# Patient Record
Sex: MALE | Race: WHITE | NOT HISPANIC OR LATINO | Employment: UNEMPLOYED | URBAN - METROPOLITAN AREA
[De-identification: names, ages, dates, MRNs, and addresses within clinical notes are randomized per-mention and may not be internally consistent; named-entity substitution may affect disease eponyms.]

---

## 2020-05-06 ENCOUNTER — HOSPITAL ENCOUNTER (OUTPATIENT)
Facility: HOSPITAL | Age: 70
Setting detail: SURGERY ADMIT
End: 2020-05-06
Attending: THORACIC SURGERY (CARDIOTHORACIC VASCULAR SURGERY) | Admitting: THORACIC SURGERY (CARDIOTHORACIC VASCULAR SURGERY)

## 2020-05-06 DIAGNOSIS — I25.118 CORONARY ARTERY DISEASE INVOLVING NATIVE CORONARY ARTERY OF NATIVE HEART WITH OTHER FORM OF ANGINA PECTORIS (HCC): Primary | ICD-10-CM

## 2020-05-06 PROBLEM — I25.10 CORONARY ARTERY DISEASE: Status: ACTIVE | Noted: 2020-05-06

## 2020-05-07 ENCOUNTER — HOSPITAL ENCOUNTER (INPATIENT)
Facility: HOSPITAL | Age: 70
LOS: 6 days | Discharge: HOME HEALTH | End: 2020-05-13
Attending: INTERNAL MEDICINE | Admitting: THORACIC SURGERY (CARDIOTHORACIC VASCULAR SURGERY)

## 2020-05-07 ENCOUNTER — APPOINTMENT (OUTPATIENT)
Dept: GENERAL RADIOLOGY | Facility: HOSPITAL | Age: 70
End: 2020-05-07

## 2020-05-07 DIAGNOSIS — I25.119 CORONARY ARTERY DISEASE INVOLVING NATIVE CORONARY ARTERY OF NATIVE HEART WITH ANGINA PECTORIS (HCC): Primary | ICD-10-CM

## 2020-05-07 PROBLEM — I25.10 CAD (CORONARY ARTERY DISEASE): Status: ACTIVE | Noted: 2020-05-07

## 2020-05-07 PROBLEM — I21.4 NSTEMI (NON-ST ELEVATED MYOCARDIAL INFARCTION) (HCC): Status: ACTIVE | Noted: 2020-05-07

## 2020-05-07 PROBLEM — C34.92 SQUAMOUS CELL CARCINOMA LUNG, LEFT (HCC): Status: ACTIVE | Noted: 2020-05-07

## 2020-05-07 PROBLEM — J44.9 COPD (CHRONIC OBSTRUCTIVE PULMONARY DISEASE) (HCC): Status: ACTIVE | Noted: 2020-05-07

## 2020-05-07 PROBLEM — C44.329 SQUAMOUS CELL CARCINOMA OF SKIN OF CHEEK: Status: ACTIVE | Noted: 2020-05-07

## 2020-05-07 PROBLEM — M19.90 OSTEOARTHRITIS: Status: ACTIVE | Noted: 2020-05-07

## 2020-05-07 LAB
ABO GROUP BLD: NORMAL
ALBUMIN SERPL-MCNC: 3.9 G/DL (ref 3.5–5.2)
ALBUMIN/GLOB SERPL: 1.2 G/DL
ALP SERPL-CCNC: 50 U/L (ref 39–117)
ALT SERPL W P-5'-P-CCNC: 20 U/L (ref 1–41)
AMPHET+METHAMPHET UR QL: NEGATIVE
AMPHETAMINES UR QL: NEGATIVE
ANION GAP SERPL CALCULATED.3IONS-SCNC: 12 MMOL/L (ref 5–15)
APTT PPP: 49.1 SECONDS (ref 50–95)
AST SERPL-CCNC: 30 U/L (ref 1–40)
BARBITURATES UR QL SCN: NEGATIVE
BASOPHILS # BLD AUTO: 0.05 10*3/MM3 (ref 0–0.2)
BASOPHILS NFR BLD AUTO: 0.6 % (ref 0–1.5)
BENZODIAZ UR QL SCN: NEGATIVE
BILIRUB SERPL-MCNC: 0.5 MG/DL (ref 0.2–1.2)
BLD GP AB SCN SERPL QL: NEGATIVE
BUN BLD-MCNC: 12 MG/DL (ref 8–23)
BUN/CREAT SERPL: 13 (ref 7–25)
BUPRENORPHINE SERPL-MCNC: NEGATIVE NG/ML
CALCIUM SPEC-SCNC: 9.4 MG/DL (ref 8.6–10.5)
CANNABINOIDS SERPL QL: NEGATIVE
CHLORIDE SERPL-SCNC: 98 MMOL/L (ref 98–107)
CO2 SERPL-SCNC: 25 MMOL/L (ref 22–29)
COCAINE UR QL: NEGATIVE
CREAT BLD-MCNC: 0.92 MG/DL (ref 0.76–1.27)
DEPRECATED RDW RBC AUTO: 47.6 FL (ref 37–54)
EOSINOPHIL # BLD AUTO: 0.14 10*3/MM3 (ref 0–0.4)
EOSINOPHIL NFR BLD AUTO: 1.7 % (ref 0.3–6.2)
ERYTHROCYTE [DISTWIDTH] IN BLOOD BY AUTOMATED COUNT: 14.3 % (ref 12.3–15.4)
GFR SERPL CREATININE-BSD FRML MDRD: 81 ML/MIN/1.73
GLOBULIN UR ELPH-MCNC: 3.3 GM/DL
GLUCOSE BLD-MCNC: 121 MG/DL (ref 65–99)
HCT VFR BLD AUTO: 45.5 % (ref 37.5–51)
HGB BLD-MCNC: 14.7 G/DL (ref 13–17.7)
IMM GRANULOCYTES # BLD AUTO: 0.03 10*3/MM3 (ref 0–0.05)
IMM GRANULOCYTES NFR BLD AUTO: 0.4 % (ref 0–0.5)
INR PPP: 1.07 (ref 0.85–1.16)
LYMPHOCYTES # BLD AUTO: 1.17 10*3/MM3 (ref 0.7–3.1)
LYMPHOCYTES NFR BLD AUTO: 13.8 % (ref 19.6–45.3)
MAGNESIUM SERPL-MCNC: 3.4 MG/DL (ref 1.6–2.4)
MCH RBC QN AUTO: 29.5 PG (ref 26.6–33)
MCHC RBC AUTO-ENTMCNC: 32.3 G/DL (ref 31.5–35.7)
MCV RBC AUTO: 91.2 FL (ref 79–97)
METHADONE UR QL SCN: NEGATIVE
MONOCYTES # BLD AUTO: 1.08 10*3/MM3 (ref 0.1–0.9)
MONOCYTES NFR BLD AUTO: 12.8 % (ref 5–12)
NEUTROPHILS # BLD AUTO: 6 10*3/MM3 (ref 1.7–7)
NEUTROPHILS NFR BLD AUTO: 70.7 % (ref 42.7–76)
NRBC BLD AUTO-RTO: 0 /100 WBC (ref 0–0.2)
OPIATES UR QL: NEGATIVE
OXYCODONE UR QL SCN: NEGATIVE
PA ADP PRP-ACNC: 192 PRU
PCP UR QL SCN: NEGATIVE
PLATELET # BLD AUTO: 253 10*3/MM3 (ref 140–450)
PMV BLD AUTO: 8.8 FL (ref 6–12)
POTASSIUM BLD-SCNC: 4.5 MMOL/L (ref 3.5–5.2)
PROPOXYPH UR QL: NEGATIVE
PROT SERPL-MCNC: 7.2 G/DL (ref 6–8.5)
PROTHROMBIN TIME: 13.6 SECONDS (ref 11.5–14)
RBC # BLD AUTO: 4.99 10*6/MM3 (ref 4.14–5.8)
RH BLD: NEGATIVE
SARS-COV-2 RNA RESP QL NAA+PROBE: NOT DETECTED
SODIUM BLD-SCNC: 135 MMOL/L (ref 136–145)
T&S EXPIRATION DATE: NORMAL
TRICYCLICS UR QL SCN: NEGATIVE
TROPONIN T SERPL-MCNC: 1.13 NG/ML (ref 0–0.03)
TROPONIN T SERPL-MCNC: 1.27 NG/ML (ref 0–0.03)
UFH PPP CHRO-ACNC: 0.1 IU/ML (ref 0.3–0.7)
UFH PPP CHRO-ACNC: 0.35 IU/ML (ref 0.3–0.7)
WBC NRBC COR # BLD: 8.47 10*3/MM3 (ref 3.4–10.8)

## 2020-05-07 PROCEDURE — 02HV33Z INSERTION OF INFUSION DEVICE INTO SUPERIOR VENA CAVA, PERCUTANEOUS APPROACH: ICD-10-PCS | Performed by: THORACIC SURGERY (CARDIOTHORACIC VASCULAR SURGERY)

## 2020-05-07 PROCEDURE — 80306 DRUG TEST PRSMV INSTRMNT: CPT | Performed by: PHYSICIAN ASSISTANT

## 2020-05-07 PROCEDURE — 85025 COMPLETE CBC W/AUTO DIFF WBC: CPT | Performed by: FAMILY MEDICINE

## 2020-05-07 PROCEDURE — 86901 BLOOD TYPING SEROLOGIC RH(D): CPT

## 2020-05-07 PROCEDURE — 84484 ASSAY OF TROPONIN QUANT: CPT | Performed by: FAMILY MEDICINE

## 2020-05-07 PROCEDURE — 25010000002 HEPARIN (PORCINE) PER 1000 UNITS: Performed by: FAMILY MEDICINE

## 2020-05-07 PROCEDURE — 71045 X-RAY EXAM CHEST 1 VIEW: CPT

## 2020-05-07 PROCEDURE — 86900 BLOOD TYPING SEROLOGIC ABO: CPT

## 2020-05-07 PROCEDURE — 87635 SARS-COV-2 COVID-19 AMP PRB: CPT | Performed by: FAMILY MEDICINE

## 2020-05-07 PROCEDURE — 86900 BLOOD TYPING SEROLOGIC ABO: CPT | Performed by: FAMILY MEDICINE

## 2020-05-07 PROCEDURE — 82962 GLUCOSE BLOOD TEST: CPT

## 2020-05-07 PROCEDURE — 86901 BLOOD TYPING SEROLOGIC RH(D): CPT | Performed by: FAMILY MEDICINE

## 2020-05-07 PROCEDURE — 93005 ELECTROCARDIOGRAM TRACING: CPT | Performed by: PHYSICIAN ASSISTANT

## 2020-05-07 PROCEDURE — 86850 RBC ANTIBODY SCREEN: CPT | Performed by: FAMILY MEDICINE

## 2020-05-07 PROCEDURE — 83735 ASSAY OF MAGNESIUM: CPT | Performed by: FAMILY MEDICINE

## 2020-05-07 PROCEDURE — 85576 BLOOD PLATELET AGGREGATION: CPT | Performed by: PHYSICIAN ASSISTANT

## 2020-05-07 PROCEDURE — 25010000002 HEPARIN (PORCINE) 25000-0.45 UT/250ML-% SOLUTION: Performed by: FAMILY MEDICINE

## 2020-05-07 PROCEDURE — 99223 1ST HOSP IP/OBS HIGH 75: CPT | Performed by: THORACIC SURGERY (CARDIOTHORACIC VASCULAR SURGERY)

## 2020-05-07 PROCEDURE — 85520 HEPARIN ASSAY: CPT | Performed by: FAMILY MEDICINE

## 2020-05-07 PROCEDURE — 85730 THROMBOPLASTIN TIME PARTIAL: CPT | Performed by: FAMILY MEDICINE

## 2020-05-07 PROCEDURE — 85610 PROTHROMBIN TIME: CPT | Performed by: FAMILY MEDICINE

## 2020-05-07 PROCEDURE — 86923 COMPATIBILITY TEST ELECTRIC: CPT

## 2020-05-07 PROCEDURE — 99223 1ST HOSP IP/OBS HIGH 75: CPT | Performed by: FAMILY MEDICINE

## 2020-05-07 PROCEDURE — 80053 COMPREHEN METABOLIC PANEL: CPT | Performed by: FAMILY MEDICINE

## 2020-05-07 PROCEDURE — 93010 ELECTROCARDIOGRAM REPORT: CPT | Performed by: INTERNAL MEDICINE

## 2020-05-07 RX ORDER — HEPARIN SODIUM 1000 [USP'U]/ML
30 INJECTION, SOLUTION INTRAVENOUS; SUBCUTANEOUS AS NEEDED
Status: DISCONTINUED | OUTPATIENT
Start: 2020-05-07 | End: 2020-05-07

## 2020-05-07 RX ORDER — IPRATROPIUM BROMIDE AND ALBUTEROL SULFATE 2.5; .5 MG/3ML; MG/3ML
3 SOLUTION RESPIRATORY (INHALATION) EVERY 4 HOURS PRN
Status: DISCONTINUED | OUTPATIENT
Start: 2020-05-07 | End: 2020-05-13 | Stop reason: HOSPADM

## 2020-05-07 RX ORDER — CHLORHEXIDINE GLUCONATE 0.12 MG/ML
15 RINSE ORAL EVERY 12 HOURS
Status: COMPLETED | OUTPATIENT
Start: 2020-05-07 | End: 2020-05-08

## 2020-05-07 RX ORDER — ACETAMINOPHEN 325 MG/1
650 TABLET ORAL EVERY 4 HOURS PRN
Status: DISCONTINUED | OUTPATIENT
Start: 2020-05-07 | End: 2020-05-13 | Stop reason: HOSPADM

## 2020-05-07 RX ORDER — ONDANSETRON 4 MG/1
4 TABLET, FILM COATED ORAL EVERY 6 HOURS PRN
Status: DISCONTINUED | OUTPATIENT
Start: 2020-05-07 | End: 2020-05-08

## 2020-05-07 RX ORDER — SODIUM CHLORIDE 0.9 % (FLUSH) 0.9 %
10 SYRINGE (ML) INJECTION EVERY 12 HOURS SCHEDULED
Status: DISCONTINUED | OUTPATIENT
Start: 2020-05-07 | End: 2020-05-11

## 2020-05-07 RX ORDER — ASPIRIN 81 MG/1
81 TABLET ORAL DAILY
Status: DISCONTINUED | OUTPATIENT
Start: 2020-05-07 | End: 2020-05-13 | Stop reason: HOSPADM

## 2020-05-07 RX ORDER — LISINOPRIL 5 MG/1
5 TABLET ORAL
Status: DISCONTINUED | OUTPATIENT
Start: 2020-05-08 | End: 2020-05-08

## 2020-05-07 RX ORDER — CARVEDILOL 3.12 MG/1
3.12 TABLET ORAL 2 TIMES DAILY WITH MEALS
Status: DISCONTINUED | OUTPATIENT
Start: 2020-05-07 | End: 2020-05-08

## 2020-05-07 RX ORDER — SODIUM CHLORIDE 0.9 % (FLUSH) 0.9 %
10 SYRINGE (ML) INJECTION EVERY 12 HOURS SCHEDULED
Status: DISCONTINUED | OUTPATIENT
Start: 2020-05-07 | End: 2020-05-08

## 2020-05-07 RX ORDER — HEPARIN SODIUM 1000 [USP'U]/ML
60 INJECTION, SOLUTION INTRAVENOUS; SUBCUTANEOUS AS NEEDED
Status: DISCONTINUED | OUTPATIENT
Start: 2020-05-07 | End: 2020-05-07

## 2020-05-07 RX ORDER — ONDANSETRON 2 MG/ML
4 INJECTION INTRAMUSCULAR; INTRAVENOUS EVERY 6 HOURS PRN
Status: DISCONTINUED | OUTPATIENT
Start: 2020-05-07 | End: 2020-05-08

## 2020-05-07 RX ORDER — HEPARIN SODIUM 1000 [USP'U]/ML
60 INJECTION, SOLUTION INTRAVENOUS; SUBCUTANEOUS ONCE
Status: COMPLETED | OUTPATIENT
Start: 2020-05-07 | End: 2020-05-07

## 2020-05-07 RX ORDER — SODIUM CHLORIDE 0.9 % (FLUSH) 0.9 %
10 SYRINGE (ML) INJECTION AS NEEDED
Status: DISCONTINUED | OUTPATIENT
Start: 2020-05-07 | End: 2020-05-08

## 2020-05-07 RX ORDER — ACETAMINOPHEN 650 MG/1
650 SUPPOSITORY RECTAL EVERY 4 HOURS PRN
Status: DISCONTINUED | OUTPATIENT
Start: 2020-05-07 | End: 2020-05-13 | Stop reason: HOSPADM

## 2020-05-07 RX ORDER — CHLORHEXIDINE GLUCONATE 500 MG/1
1 CLOTH TOPICAL EVERY 12 HOURS PRN
Status: DISCONTINUED | OUTPATIENT
Start: 2020-05-07 | End: 2020-05-10

## 2020-05-07 RX ORDER — ACETAMINOPHEN 160 MG/5ML
650 SOLUTION ORAL EVERY 4 HOURS PRN
Status: DISCONTINUED | OUTPATIENT
Start: 2020-05-07 | End: 2020-05-13 | Stop reason: HOSPADM

## 2020-05-07 RX ORDER — ATORVASTATIN CALCIUM 40 MG/1
80 TABLET, FILM COATED ORAL NIGHTLY
Status: DISCONTINUED | OUTPATIENT
Start: 2020-05-07 | End: 2020-05-13 | Stop reason: HOSPADM

## 2020-05-07 RX ORDER — HEPARIN SODIUM 10000 [USP'U]/100ML
19 INJECTION, SOLUTION INTRAVENOUS
Status: DISCONTINUED | OUTPATIENT
Start: 2020-05-07 | End: 2020-05-08

## 2020-05-07 RX ADMIN — HEPARIN SODIUM 4270 UNITS: 1000 INJECTION INTRAVENOUS; SUBCUTANEOUS at 14:46

## 2020-05-07 RX ADMIN — MUPIROCIN 1 APPLICATION: 20 OINTMENT TOPICAL at 22:04

## 2020-05-07 RX ADMIN — HEPARIN SODIUM 19 UNITS/KG/HR: 10000 INJECTION, SOLUTION INTRAVENOUS at 14:45

## 2020-05-07 RX ADMIN — ASPIRIN 81 MG: 81 TABLET, COATED ORAL at 17:17

## 2020-05-07 RX ADMIN — SODIUM CHLORIDE, PRESERVATIVE FREE 10 ML: 5 INJECTION INTRAVENOUS at 22:05

## 2020-05-07 RX ADMIN — CHLORHEXIDINE GLUCONATE 0.12% ORAL RINSE 15 ML: 1.2 LIQUID ORAL at 18:30

## 2020-05-07 RX ADMIN — ATORVASTATIN CALCIUM 80 MG: 40 TABLET, FILM COATED ORAL at 22:04

## 2020-05-07 RX ADMIN — CARVEDILOL 3.12 MG: 3.12 TABLET, FILM COATED ORAL at 17:17

## 2020-05-08 ENCOUNTER — APPOINTMENT (OUTPATIENT)
Dept: GENERAL RADIOLOGY | Facility: HOSPITAL | Age: 70
End: 2020-05-08

## 2020-05-08 ENCOUNTER — APPOINTMENT (OUTPATIENT)
Dept: CARDIOLOGY | Facility: HOSPITAL | Age: 70
End: 2020-05-08

## 2020-05-08 ENCOUNTER — APPOINTMENT (OUTPATIENT)
Dept: PULMONOLOGY | Facility: HOSPITAL | Age: 70
End: 2020-05-08

## 2020-05-08 ENCOUNTER — ANESTHESIA EVENT (OUTPATIENT)
Dept: PERIOP | Facility: HOSPITAL | Age: 70
End: 2020-05-08

## 2020-05-08 ENCOUNTER — ANESTHESIA (OUTPATIENT)
Dept: PERIOP | Facility: HOSPITAL | Age: 70
End: 2020-05-08

## 2020-05-08 PROBLEM — Z72.0 TOBACCO USE: Status: ACTIVE | Noted: 2020-05-08

## 2020-05-08 PROBLEM — J43.9 PULMONARY EMPHYSEMA (HCC): Status: ACTIVE | Noted: 2020-05-07

## 2020-05-08 LAB
ABO GROUP BLD: NORMAL
ACT BLD: 125 SECONDS (ref 82–152)
ACT BLD: 147 SECONDS (ref 82–152)
ACT BLD: 466 SECONDS (ref 82–152)
ACT BLD: 527 SECONDS (ref 82–152)
ACT BLD: 599 SECONDS (ref 82–152)
ALBUMIN SERPL-MCNC: 2.8 G/DL (ref 3.5–5.2)
ALBUMIN SERPL-MCNC: 4.3 G/DL (ref 3.5–5.2)
ANION GAP SERPL CALCULATED.3IONS-SCNC: 13 MMOL/L (ref 5–15)
ANION GAP SERPL CALCULATED.3IONS-SCNC: 13 MMOL/L (ref 5–15)
ANION GAP SERPL CALCULATED.3IONS-SCNC: 17 MMOL/L (ref 5–15)
APTT PPP: 34.6 SECONDS (ref 24–37)
ARTERIAL PATENCY WRIST A: ABNORMAL
ARTERIAL PATENCY WRIST A: ABNORMAL
ASCENDING AORTA: 2.9 CM
ATMOSPHERIC PRESS: ABNORMAL MM[HG]
ATMOSPHERIC PRESS: ABNORMAL MM[HG]
BASE EXCESS BLDA CALC-SCNC: -2 MMOL/L (ref -5–5)
BASE EXCESS BLDA CALC-SCNC: -3 MMOL/L (ref -5–5)
BASE EXCESS BLDA CALC-SCNC: 0 MMOL/L (ref -5–5)
BASE EXCESS BLDA CALC-SCNC: 0.6 MMOL/L (ref 0–2)
BASE EXCESS BLDA CALC-SCNC: 1.1 MMOL/L (ref 0–2)
BASE EXCESS BLDA CALC-SCNC: 2 MMOL/L (ref -5–5)
BASE EXCESS BLDA CALC-SCNC: 3 MMOL/L (ref -5–5)
BASE EXCESS BLDA CALC-SCNC: 4 MMOL/L (ref -5–5)
BASOPHILS # BLD AUTO: 0.07 10*3/MM3 (ref 0–0.2)
BASOPHILS NFR BLD AUTO: 0.8 % (ref 0–1.5)
BDY SITE: ABNORMAL
BDY SITE: ABNORMAL
BH CV ECHO MEAS - AO MAX PG (FULL): 1.4 MMHG
BH CV ECHO MEAS - AO MAX PG: 5.7 MMHG
BH CV ECHO MEAS - AO MEAN PG (FULL): 1 MMHG
BH CV ECHO MEAS - AO MEAN PG: 3 MMHG
BH CV ECHO MEAS - AO ROOT AREA (BSA CORRECTED): 1.8
BH CV ECHO MEAS - AO ROOT AREA: 8 CM^2
BH CV ECHO MEAS - AO ROOT DIAM: 3.2 CM
BH CV ECHO MEAS - AO V2 MAX: 119 CM/SEC
BH CV ECHO MEAS - AO V2 MEAN: 76 CM/SEC
BH CV ECHO MEAS - AO V2 VTI: 20.3 CM
BH CV ECHO MEAS - ASC AORTA: 2.9 CM
BH CV ECHO MEAS - AVA(I,A): 2.5 CM^2
BH CV ECHO MEAS - AVA(I,D): 2.5 CM^2
BH CV ECHO MEAS - AVA(V,A): 2.5 CM^2
BH CV ECHO MEAS - AVA(V,D): 2.5 CM^2
BH CV ECHO MEAS - BSA(HAYCOCK): 1.8 M^2
BH CV ECHO MEAS - BSA(HAYCOCK): 1.8 M^2
BH CV ECHO MEAS - BSA: 1.8 M^2
BH CV ECHO MEAS - BSA: 1.8 M^2
BH CV ECHO MEAS - BZI_BMI: 26.6 KILOGRAMS/M^2
BH CV ECHO MEAS - BZI_BMI: 26.6 KILOGRAMS/M^2
BH CV ECHO MEAS - BZI_METRIC_HEIGHT: 165.1 CM
BH CV ECHO MEAS - BZI_METRIC_HEIGHT: 165.1 CM
BH CV ECHO MEAS - BZI_METRIC_WEIGHT: 72.6 KG
BH CV ECHO MEAS - BZI_METRIC_WEIGHT: 72.6 KG
BH CV ECHO MEAS - EDV(CUBED): 52.7 ML
BH CV ECHO MEAS - EDV(TEICH): 60 ML
BH CV ECHO MEAS - EF(CUBED): 67.4 %
BH CV ECHO MEAS - EF(MOD-BP): 58 %
BH CV ECHO MEAS - EF(TEICH): 59.8 %
BH CV ECHO MEAS - ESV(CUBED): 17.2 ML
BH CV ECHO MEAS - ESV(TEICH): 24.1 ML
BH CV ECHO MEAS - FS: 31.2 %
BH CV ECHO MEAS - IVS/LVPW: 0.8
BH CV ECHO MEAS - IVSD: 0.7 CM
BH CV ECHO MEAS - LA DIMENSION: 3 CM
BH CV ECHO MEAS - LA/AO: 0.94
BH CV ECHO MEAS - LAD MAJOR: 3.9 CM
BH CV ECHO MEAS - LAT PEAK E' VEL: 6.7 CM/SEC
BH CV ECHO MEAS - LATERAL E/E' RATIO: 7.3
BH CV ECHO MEAS - LV MASS(C)D: 86.2 GRAMS
BH CV ECHO MEAS - LV MASS(C)DI: 47.9 GRAMS/M^2
BH CV ECHO MEAS - LV MAX PG: 4.2 MMHG
BH CV ECHO MEAS - LV MEAN PG: 2 MMHG
BH CV ECHO MEAS - LV V1 MAX: 103 CM/SEC
BH CV ECHO MEAS - LV V1 MEAN: 61.5 CM/SEC
BH CV ECHO MEAS - LV V1 VTI: 17.6 CM
BH CV ECHO MEAS - LVIDD: 3.8 CM
BH CV ECHO MEAS - LVIDS: 2.6 CM
BH CV ECHO MEAS - LVOT AREA (M): 2.8 CM^2
BH CV ECHO MEAS - LVOT AREA: 2.8 CM^2
BH CV ECHO MEAS - LVOT DIAM: 1.9 CM
BH CV ECHO MEAS - LVPWD: 0.9 CM
BH CV ECHO MEAS - MED PEAK E' VEL: 5.8 CM/SEC
BH CV ECHO MEAS - MEDIAL E/E' RATIO: 8.4
BH CV ECHO MEAS - MV A MAX VEL: 78 CM/SEC
BH CV ECHO MEAS - MV DEC TIME: 0.23 SEC
BH CV ECHO MEAS - MV E MAX VEL: 48.9 CM/SEC
BH CV ECHO MEAS - MV E/A: 0.63
BH CV ECHO MEAS - PA ACC SLOPE: 554 CM/SEC^2
BH CV ECHO MEAS - PA ACC TIME: 0.15 SEC
BH CV ECHO MEAS - PA PR(ACCEL): 12.4 MMHG
BH CV ECHO MEAS - RAP SYSTOLE: 6 MMHG
BH CV ECHO MEAS - RVSP: 24 MMHG
BH CV ECHO MEAS - SI(AO): 90.7 ML/M^2
BH CV ECHO MEAS - SI(CUBED): 19.8 ML/M^2
BH CV ECHO MEAS - SI(LVOT): 27.7 ML/M^2
BH CV ECHO MEAS - SI(TEICH): 19.9 ML/M^2
BH CV ECHO MEAS - SV(AO): 163.3 ML
BH CV ECHO MEAS - SV(CUBED): 35.6 ML
BH CV ECHO MEAS - SV(LVOT): 49.9 ML
BH CV ECHO MEAS - SV(TEICH): 35.9 ML
BH CV ECHO MEAS - TAPSE (>1.6): 1.6 CM2
BH CV ECHO MEAS - TR MAX PG: 18 MMHG
BH CV ECHO MEAS - TR MAX VEL: 212 CM/SEC
BH CV ECHO MEASUREMENTS AVERAGE E/E' RATIO: 7.82
BH CV XLRA MEAS LEFT DIST CCA EDV: 27 CM/SEC
BH CV XLRA MEAS LEFT DIST CCA PSV: 94.8 CM/SEC
BH CV XLRA MEAS LEFT DIST ICA EDV: 30.3 CM/SEC
BH CV XLRA MEAS LEFT DIST ICA PSV: 84.4 CM/SEC
BH CV XLRA MEAS LEFT ICA/CCA RATIO: 1
BH CV XLRA MEAS LEFT MID CCA EDV: 20.1 CM/SEC
BH CV XLRA MEAS LEFT MID CCA PSV: 79.6 CM/SEC
BH CV XLRA MEAS LEFT MID ICA EDV: 33.6 CM/SEC
BH CV XLRA MEAS LEFT MID ICA PSV: 93.7 CM/SEC
BH CV XLRA MEAS LEFT PROX CCA EDV: 25 CM/SEC
BH CV XLRA MEAS LEFT PROX CCA PSV: 90.4 CM/SEC
BH CV XLRA MEAS LEFT PROX ECA EDV: 25.9 CM/SEC
BH CV XLRA MEAS LEFT PROX ECA PSV: 151 CM/SEC
BH CV XLRA MEAS LEFT PROX ICA EDV: 19.6 CM/SEC
BH CV XLRA MEAS LEFT PROX ICA PSV: 58.5 CM/SEC
BH CV XLRA MEAS LEFT PROX SCLA EDV: 0 CM/SEC
BH CV XLRA MEAS LEFT PROX SCLA PSV: 96.3 CM/SEC
BH CV XLRA MEAS LEFT VERTEBRAL A EDV: 18.7 CM/SEC
BH CV XLRA MEAS LEFT VERTEBRAL A PSV: 51.8 CM/SEC
BH CV XLRA MEAS RIGHT DIST CCA EDV: 30.2 CM/SEC
BH CV XLRA MEAS RIGHT DIST CCA PSV: 101 CM/SEC
BH CV XLRA MEAS RIGHT DIST ICA EDV: 33.4 CM/SEC
BH CV XLRA MEAS RIGHT DIST ICA PSV: 80 CM/SEC
BH CV XLRA MEAS RIGHT ICA/CCA RATIO: 0.8
BH CV XLRA MEAS RIGHT MID CCA EDV: 25.1 CM/SEC
BH CV XLRA MEAS RIGHT MID CCA PSV: 94.3 CM/SEC
BH CV XLRA MEAS RIGHT MID ICA EDV: 32.4 CM/SEC
BH CV XLRA MEAS RIGHT MID ICA PSV: 75.6 CM/SEC
BH CV XLRA MEAS RIGHT PROX CCA EDV: 23.3 CM/SEC
BH CV XLRA MEAS RIGHT PROX CCA PSV: 110 CM/SEC
BH CV XLRA MEAS RIGHT PROX ECA EDV: 18.9 CM/SEC
BH CV XLRA MEAS RIGHT PROX ECA PSV: 122 CM/SEC
BH CV XLRA MEAS RIGHT PROX ICA EDV: 20.6 CM/SEC
BH CV XLRA MEAS RIGHT PROX ICA PSV: 51.6 CM/SEC
BH CV XLRA MEAS RIGHT PROX SCLA EDV: 0 CM/SEC
BH CV XLRA MEAS RIGHT PROX SCLA PSV: 83.6 CM/SEC
BH CV XLRA MEAS RIGHT VERTEBRAL A EDV: 15.2 CM/SEC
BH CV XLRA MEAS RIGHT VERTEBRAL A PSV: 37.8 CM/SEC
BODY TEMPERATURE: 37 C
BODY TEMPERATURE: 37 C
BUN BLD-MCNC: 12 MG/DL (ref 8–23)
BUN BLD-MCNC: 13 MG/DL (ref 8–23)
BUN BLD-MCNC: 14 MG/DL (ref 8–23)
BUN/CREAT SERPL: 11.5 (ref 7–25)
BUN/CREAT SERPL: 13.2 (ref 7–25)
BUN/CREAT SERPL: 14.6 (ref 7–25)
CA-I BLDA-SCNC: 0.95 MMOL/L (ref 1.2–1.32)
CA-I BLDA-SCNC: 0.97 MMOL/L (ref 1.2–1.32)
CA-I BLDA-SCNC: 0.99 MMOL/L (ref 1.2–1.32)
CA-I BLDA-SCNC: 1.16 MMOL/L (ref 1.2–1.32)
CA-I BLDA-SCNC: 1.21 MMOL/L (ref 1.2–1.32)
CA-I BLDA-SCNC: 1.4 MMOL/L (ref 1.2–1.32)
CA-I SERPL ISE-MCNC: 1.42 MMOL/L (ref 1.12–1.32)
CALCIUM SPEC-SCNC: 8.9 MG/DL (ref 8.6–10.5)
CALCIUM SPEC-SCNC: 9.1 MG/DL (ref 8.6–10.5)
CALCIUM SPEC-SCNC: 9.6 MG/DL (ref 8.6–10.5)
CHLORIDE SERPL-SCNC: 102 MMOL/L (ref 98–107)
CHLORIDE SERPL-SCNC: 98 MMOL/L (ref 98–107)
CHLORIDE SERPL-SCNC: 99 MMOL/L (ref 98–107)
CHOLEST SERPL-MCNC: 155 MG/DL (ref 0–200)
CO2 BLDA-SCNC: 24 MMOL/L (ref 24–29)
CO2 BLDA-SCNC: 24 MMOL/L (ref 24–29)
CO2 BLDA-SCNC: 24.8 MMOL/L (ref 22–33)
CO2 BLDA-SCNC: 27 MMOL/L (ref 24–29)
CO2 BLDA-SCNC: 27 MMOL/L (ref 24–29)
CO2 BLDA-SCNC: 28 MMOL/L (ref 24–29)
CO2 BLDA-SCNC: 28 MMOL/L (ref 24–29)
CO2 BLDA-SCNC: 28.3 MMOL/L (ref 22–33)
CO2 SERPL-SCNC: 22 MMOL/L (ref 22–29)
CO2 SERPL-SCNC: 23 MMOL/L (ref 22–29)
CO2 SERPL-SCNC: 23 MMOL/L (ref 22–29)
COHGB MFR BLD: 0.9 % (ref 0–2)
COHGB MFR BLD: 1.1 % (ref 0–2)
CREAT BLD-MCNC: 0.91 MG/DL (ref 0.76–1.27)
CREAT BLD-MCNC: 0.96 MG/DL (ref 0.76–1.27)
CREAT BLD-MCNC: 1.13 MG/DL (ref 0.76–1.27)
DEPRECATED RDW RBC AUTO: 48 FL (ref 37–54)
DEPRECATED RDW RBC AUTO: 48.1 FL (ref 37–54)
DEPRECATED RDW RBC AUTO: 48.9 FL (ref 37–54)
EOSINOPHIL # BLD AUTO: 0.29 10*3/MM3 (ref 0–0.4)
EOSINOPHIL NFR BLD AUTO: 3.4 % (ref 0.3–6.2)
ERYTHROCYTE [DISTWIDTH] IN BLOOD BY AUTOMATED COUNT: 14.1 % (ref 12.3–15.4)
ERYTHROCYTE [DISTWIDTH] IN BLOOD BY AUTOMATED COUNT: 14.2 % (ref 12.3–15.4)
ERYTHROCYTE [DISTWIDTH] IN BLOOD BY AUTOMATED COUNT: 14.4 % (ref 12.3–15.4)
GFR SERPL CREATININE-BSD FRML MDRD: 64 ML/MIN/1.73
GFR SERPL CREATININE-BSD FRML MDRD: 77 ML/MIN/1.73
GFR SERPL CREATININE-BSD FRML MDRD: 82 ML/MIN/1.73
GLUCOSE BLD-MCNC: 107 MG/DL (ref 65–99)
GLUCOSE BLD-MCNC: 124 MG/DL (ref 65–99)
GLUCOSE BLD-MCNC: 179 MG/DL (ref 65–99)
GLUCOSE BLDC GLUCOMTR-MCNC: 109 MG/DL (ref 70–130)
GLUCOSE BLDC GLUCOMTR-MCNC: 111 MG/DL (ref 70–130)
GLUCOSE BLDC GLUCOMTR-MCNC: 112 MG/DL (ref 70–130)
GLUCOSE BLDC GLUCOMTR-MCNC: 115 MG/DL (ref 70–130)
GLUCOSE BLDC GLUCOMTR-MCNC: 129 MG/DL (ref 70–130)
GLUCOSE BLDC GLUCOMTR-MCNC: 132 MG/DL (ref 70–130)
GLUCOSE BLDC GLUCOMTR-MCNC: 165 MG/DL (ref 70–130)
GLUCOSE BLDC GLUCOMTR-MCNC: 166 MG/DL (ref 70–130)
GLUCOSE BLDC GLUCOMTR-MCNC: 177 MG/DL (ref 70–130)
GLUCOSE BLDC GLUCOMTR-MCNC: 178 MG/DL (ref 70–130)
GLUCOSE BLDC GLUCOMTR-MCNC: 185 MG/DL (ref 70–130)
GLUCOSE BLDC GLUCOMTR-MCNC: 93 MG/DL (ref 70–130)
HBA1C MFR BLD: 5.8 % (ref 4.8–5.6)
HCO3 BLDA-SCNC: 22.6 MMOL/L (ref 22–26)
HCO3 BLDA-SCNC: 23.2 MMOL/L (ref 22–26)
HCO3 BLDA-SCNC: 23.8 MMOL/L (ref 20–26)
HCO3 BLDA-SCNC: 25.4 MMOL/L (ref 22–26)
HCO3 BLDA-SCNC: 25.5 MMOL/L (ref 22–26)
HCO3 BLDA-SCNC: 26.5 MMOL/L (ref 22–26)
HCO3 BLDA-SCNC: 26.9 MMOL/L (ref 20–26)
HCO3 BLDA-SCNC: 27.3 MMOL/L (ref 22–26)
HCT VFR BLD AUTO: 29.5 % (ref 37.5–51)
HCT VFR BLD AUTO: 32.4 % (ref 37.5–51)
HCT VFR BLD AUTO: 44.4 % (ref 37.5–51)
HCT VFR BLD CALC: 30.9 %
HCT VFR BLD CALC: 32 %
HCT VFR BLDA CALC: 20 % (ref 38–51)
HCT VFR BLDA CALC: 26 % (ref 38–51)
HCT VFR BLDA CALC: 27 % (ref 38–51)
HCT VFR BLDA CALC: 28 % (ref 38–51)
HCT VFR BLDA CALC: 33 % (ref 38–51)
HCT VFR BLDA CALC: 41 % (ref 38–51)
HDLC SERPL-MCNC: 48 MG/DL (ref 40–60)
HGB BLD-MCNC: 10.4 G/DL (ref 13–17.7)
HGB BLD-MCNC: 14.1 G/DL (ref 13–17.7)
HGB BLD-MCNC: 9.5 G/DL (ref 13–17.7)
HGB BLDA-MCNC: 10.1 G/DL (ref 13.5–17.5)
HGB BLDA-MCNC: 10.4 G/DL (ref 13.5–17.5)
HGB BLDA-MCNC: 11.2 G/DL (ref 12–17)
HGB BLDA-MCNC: 13.9 G/DL (ref 12–17)
HGB BLDA-MCNC: 6.8 G/DL (ref 12–17)
HGB BLDA-MCNC: 8.8 G/DL (ref 12–17)
HGB BLDA-MCNC: 9.2 G/DL (ref 12–17)
HGB BLDA-MCNC: 9.5 G/DL (ref 12–17)
HOROWITZ INDEX BLD+IHG-RTO: 100 %
HOROWITZ INDEX BLD+IHG-RTO: 40 %
IMM GRANULOCYTES # BLD AUTO: 0.04 10*3/MM3 (ref 0–0.05)
IMM GRANULOCYTES NFR BLD AUTO: 0.5 % (ref 0–0.5)
INR PPP: 1.36 (ref 0.85–1.16)
LDLC SERPL CALC-MCNC: 80 MG/DL (ref 0–100)
LDLC/HDLC SERPL: 1.66 {RATIO}
LEFT ATRIUM VOLUME INDEX: 14.5 ML/M^2
LEFT ATRIUM VOLUME: 26 ML
LV EF 2D ECHO EST: 60 %
LYMPHOCYTES # BLD AUTO: 0.9 10*3/MM3 (ref 0.7–3.1)
LYMPHOCYTES NFR BLD AUTO: 10.5 % (ref 19.6–45.3)
MAGNESIUM SERPL-MCNC: 2.8 MG/DL (ref 1.6–2.4)
MAGNESIUM SERPL-MCNC: 3.1 MG/DL (ref 1.6–2.4)
MAXIMAL PREDICTED HEART RATE: 150 BPM
MCH RBC QN AUTO: 29.4 PG (ref 26.6–33)
MCH RBC QN AUTO: 29.9 PG (ref 26.6–33)
MCH RBC QN AUTO: 30.2 PG (ref 26.6–33)
MCHC RBC AUTO-ENTMCNC: 31.8 G/DL (ref 31.5–35.7)
MCHC RBC AUTO-ENTMCNC: 32.1 G/DL (ref 31.5–35.7)
MCHC RBC AUTO-ENTMCNC: 32.2 G/DL (ref 31.5–35.7)
MCV RBC AUTO: 92.7 FL (ref 79–97)
MCV RBC AUTO: 93.1 FL (ref 79–97)
MCV RBC AUTO: 93.7 FL (ref 79–97)
METHGB BLD QL: 1.1 % (ref 0–1.5)
METHGB BLD QL: 1.3 % (ref 0–1.5)
MODALITY: ABNORMAL
MODALITY: ABNORMAL
MONOCYTES # BLD AUTO: 1.18 10*3/MM3 (ref 0.1–0.9)
MONOCYTES NFR BLD AUTO: 13.8 % (ref 5–12)
NEUTROPHILS # BLD AUTO: 6.06 10*3/MM3 (ref 1.7–7)
NEUTROPHILS NFR BLD AUTO: 71 % (ref 42.7–76)
NOTE: ABNORMAL
NOTE: ABNORMAL
NRBC BLD AUTO-RTO: 0 /100 WBC (ref 0–0.2)
OXYHGB MFR BLDV: 96.6 % (ref 94–99)
OXYHGB MFR BLDV: 98.3 % (ref 94–99)
PA ADP PRP-ACNC: 172 PRU
PCO2 BLDA: 32.4 MM HG (ref 35–45)
PCO2 BLDA: 36.6 MM HG (ref 35–45)
PCO2 BLDA: 36.6 MM HG (ref 35–45)
PCO2 BLDA: 36.9 MM HG (ref 35–45)
PCO2 BLDA: 38.6 MM HG (ref 35–45)
PCO2 BLDA: 42.7 MM HG (ref 35–45)
PCO2 BLDA: 44.1 MM HG (ref 35–45)
PCO2 BLDA: 46.9 MM HG (ref 35–45)
PCO2 TEMP ADJ BLD: 32.4 MM HG (ref 35–48)
PCO2 TEMP ADJ BLD: 46.9 MM HG (ref 35–48)
PH BLDA: 7.33 PH UNITS (ref 7.35–7.6)
PH BLDA: 7.37 PH UNITS (ref 7.35–7.45)
PH BLDA: 7.37 PH UNITS (ref 7.35–7.6)
PH BLDA: 7.39 PH UNITS (ref 7.35–7.6)
PH BLDA: 7.45 PH UNITS (ref 7.35–7.6)
PH BLDA: 7.47 PH UNITS (ref 7.35–7.45)
PH BLDA: 7.47 PH UNITS (ref 7.35–7.6)
PH BLDA: 7.48 PH UNITS (ref 7.35–7.6)
PH, TEMP CORRECTED: 7.37 PH UNITS
PH, TEMP CORRECTED: 7.47 PH UNITS
PHOSPHATE SERPL-MCNC: 2.8 MG/DL (ref 2.5–4.5)
PHOSPHATE SERPL-MCNC: 3.6 MG/DL (ref 2.5–4.5)
PLATELET # BLD AUTO: 197 10*3/MM3 (ref 140–450)
PLATELET # BLD AUTO: 213 10*3/MM3 (ref 140–450)
PLATELET # BLD AUTO: 238 10*3/MM3 (ref 140–450)
PMV BLD AUTO: 9.3 FL (ref 6–12)
PMV BLD AUTO: 9.4 FL (ref 6–12)
PMV BLD AUTO: 9.7 FL (ref 6–12)
PO2 BLDA: 134 MM HG (ref 83–108)
PO2 BLDA: 365 MMHG (ref 80–105)
PO2 BLDA: 371 MMHG (ref 80–105)
PO2 BLDA: 427 MMHG (ref 80–105)
PO2 BLDA: 45 MMHG (ref 80–105)
PO2 BLDA: 450 MM HG (ref 83–108)
PO2 BLDA: 462 MMHG (ref 80–105)
PO2 BLDA: 86 MMHG (ref 80–105)
PO2 TEMP ADJ BLD: 134 MM HG (ref 83–108)
PO2 TEMP ADJ BLD: 450 MM HG (ref 83–108)
POTASSIUM BLD-SCNC: 4.2 MMOL/L (ref 3.5–5.2)
POTASSIUM BLD-SCNC: 4.3 MMOL/L (ref 3.5–5.2)
POTASSIUM BLD-SCNC: 4.5 MMOL/L (ref 3.5–5.2)
POTASSIUM BLDA-SCNC: 4.5 MMOL/L (ref 3.5–4.9)
POTASSIUM BLDA-SCNC: 4.5 MMOL/L (ref 3.5–4.9)
POTASSIUM BLDA-SCNC: 4.8 MMOL/L (ref 3.5–4.9)
POTASSIUM BLDA-SCNC: 5.3 MMOL/L (ref 3.5–4.9)
POTASSIUM BLDA-SCNC: 5.5 MMOL/L (ref 3.5–4.9)
POTASSIUM BLDA-SCNC: 5.9 MMOL/L (ref 3.5–4.9)
PROTHROMBIN TIME: 16.5 SECONDS (ref 11.5–14)
RBC # BLD AUTO: 3.15 10*6/MM3 (ref 4.14–5.8)
RBC # BLD AUTO: 3.48 10*6/MM3 (ref 4.14–5.8)
RBC # BLD AUTO: 4.79 10*6/MM3 (ref 4.14–5.8)
RH BLD: NEGATIVE
SAO2 % BLDA: 100 % (ref 95–98)
SAO2 % BLDA: 77 % (ref 95–98)
SAO2 % BLDA: 96 % (ref 95–98)
SODIUM BLD-SCNC: 134 MMOL/L (ref 136–145)
SODIUM BLD-SCNC: 137 MMOL/L (ref 136–145)
SODIUM BLD-SCNC: 139 MMOL/L (ref 136–145)
SODIUM BLDA-SCNC: 130 MMOL/L (ref 138–146)
SODIUM BLDA-SCNC: 131 MMOL/L (ref 138–146)
SODIUM BLDA-SCNC: 132 MMOL/L (ref 138–146)
SODIUM BLDA-SCNC: 133 MMOL/L (ref 138–146)
SODIUM BLDA-SCNC: 133 MMOL/L (ref 138–146)
SODIUM BLDA-SCNC: 136 MMOL/L (ref 138–146)
STRESS TARGET HR: 128 BPM
TRIGL SERPL-MCNC: 136 MG/DL (ref 0–150)
TROPONIN T SERPL-MCNC: 1.02 NG/ML (ref 0–0.03)
TSH SERPL DL<=0.05 MIU/L-ACNC: 3.01 UIU/ML (ref 0.27–4.2)
UFH PPP CHRO-ACNC: 0.2 IU/ML (ref 0.3–0.7)
VENTILATOR MODE: ABNORMAL
VENTILATOR MODE: ABNORMAL
VLDLC SERPL-MCNC: 27.2 MG/DL
WBC NRBC COR # BLD: 14.54 10*3/MM3 (ref 3.4–10.8)
WBC NRBC COR # BLD: 8.54 10*3/MM3 (ref 3.4–10.8)
WBC NRBC COR # BLD: 8.66 10*3/MM3 (ref 3.4–10.8)

## 2020-05-08 PROCEDURE — 94799 UNLISTED PULMONARY SVC/PX: CPT

## 2020-05-08 PROCEDURE — 25010000002 HEPARIN (PORCINE) PER 1000 UNITS: Performed by: THORACIC SURGERY (CARDIOTHORACIC VASCULAR SURGERY)

## 2020-05-08 PROCEDURE — 84132 ASSAY OF SERUM POTASSIUM: CPT

## 2020-05-08 PROCEDURE — 33519 CABG ARTERY-VEIN THREE: CPT | Performed by: THORACIC SURGERY (CARDIOTHORACIC VASCULAR SURGERY)

## 2020-05-08 PROCEDURE — 25010000003 CEFUROXIME SODIUM 1.5 G RECONSTITUTED SOLUTION: Performed by: PHYSICIAN ASSISTANT

## 2020-05-08 PROCEDURE — 25810000003 DEXTROSE 5 % WITH KCL 20 MEQ 20-5 MEQ/L-% SOLUTION: Performed by: PHYSICIAN ASSISTANT

## 2020-05-08 PROCEDURE — 94770: CPT

## 2020-05-08 PROCEDURE — 99233 SBSQ HOSP IP/OBS HIGH 50: CPT | Performed by: INTERNAL MEDICINE

## 2020-05-08 PROCEDURE — 25010000002 PROPOFOL 10 MG/ML EMULSION: Performed by: THORACIC SURGERY (CARDIOTHORACIC VASCULAR SURGERY)

## 2020-05-08 PROCEDURE — 25010000002 SULFUR HEXAFLUORIDE MICROSPH 60.7-25 MG RECONSTITUTED SUSPENSION: Performed by: PHYSICIAN ASSISTANT

## 2020-05-08 PROCEDURE — 5A1221Z PERFORMANCE OF CARDIAC OUTPUT, CONTINUOUS: ICD-10-PCS | Performed by: THORACIC SURGERY (CARDIOTHORACIC VASCULAR SURGERY)

## 2020-05-08 PROCEDURE — 25010000002 MIDAZOLAM PER 1 MG: Performed by: ANESTHESIOLOGY

## 2020-05-08 PROCEDURE — P9041 ALBUMIN (HUMAN),5%, 50ML: HCPCS | Performed by: PHYSICIAN ASSISTANT

## 2020-05-08 PROCEDURE — P9037 PLATE PHERES LEUKOREDU IRRAD: HCPCS

## 2020-05-08 PROCEDURE — 33533 CABG ARTERIAL SINGLE: CPT | Performed by: THORACIC SURGERY (CARDIOTHORACIC VASCULAR SURGERY)

## 2020-05-08 PROCEDURE — 85014 HEMATOCRIT: CPT

## 2020-05-08 PROCEDURE — 86901 BLOOD TYPING SEROLOGIC RH(D): CPT

## 2020-05-08 PROCEDURE — 84295 ASSAY OF SERUM SODIUM: CPT

## 2020-05-08 PROCEDURE — 93306 TTE W/DOPPLER COMPLETE: CPT | Performed by: INTERNAL MEDICINE

## 2020-05-08 PROCEDURE — 71045 X-RAY EXAM CHEST 1 VIEW: CPT

## 2020-05-08 PROCEDURE — 82330 ASSAY OF CALCIUM: CPT

## 2020-05-08 PROCEDURE — 80061 LIPID PANEL: CPT | Performed by: FAMILY MEDICINE

## 2020-05-08 PROCEDURE — 83735 ASSAY OF MAGNESIUM: CPT | Performed by: PHYSICIAN ASSISTANT

## 2020-05-08 PROCEDURE — C1894 INTRO/SHEATH, NON-LASER: HCPCS | Performed by: THORACIC SURGERY (CARDIOTHORACIC VASCULAR SURGERY)

## 2020-05-08 PROCEDURE — 80069 RENAL FUNCTION PANEL: CPT | Performed by: PHYSICIAN ASSISTANT

## 2020-05-08 PROCEDURE — 25010000002 PAPAVERINE PER 60 MG: Performed by: THORACIC SURGERY (CARDIOTHORACIC VASCULAR SURGERY)

## 2020-05-08 PROCEDURE — 85025 COMPLETE CBC W/AUTO DIFF WBC: CPT | Performed by: FAMILY MEDICINE

## 2020-05-08 PROCEDURE — 93306 TTE W/DOPPLER COMPLETE: CPT

## 2020-05-08 PROCEDURE — 25010000003 CEFUROXIME SODIUM 1.5 G RECONSTITUTED SOLUTION: Performed by: ANESTHESIOLOGY

## 2020-05-08 PROCEDURE — 84443 ASSAY THYROID STIM HORMONE: CPT | Performed by: FAMILY MEDICINE

## 2020-05-08 PROCEDURE — 25010000002 PROTAMINE SULFATE PER 10 MG: Performed by: ANESTHESIOLOGY

## 2020-05-08 PROCEDURE — 94002 VENT MGMT INPAT INIT DAY: CPT

## 2020-05-08 PROCEDURE — 85576 BLOOD PLATELET AGGREGATION: CPT | Performed by: PHYSICIAN ASSISTANT

## 2020-05-08 PROCEDURE — 93880 EXTRACRANIAL BILAT STUDY: CPT

## 2020-05-08 PROCEDURE — 06BQ4ZZ EXCISION OF LEFT SAPHENOUS VEIN, PERCUTANEOUS ENDOSCOPIC APPROACH: ICD-10-PCS | Performed by: THORACIC SURGERY (CARDIOTHORACIC VASCULAR SURGERY)

## 2020-05-08 PROCEDURE — 86900 BLOOD TYPING SEROLOGIC ABO: CPT

## 2020-05-08 PROCEDURE — 36430 TRANSFUSION BLD/BLD COMPNT: CPT

## 2020-05-08 PROCEDURE — 85347 COAGULATION TIME ACTIVATED: CPT

## 2020-05-08 PROCEDURE — 82805 BLOOD GASES W/O2 SATURATION: CPT

## 2020-05-08 PROCEDURE — 99024 POSTOP FOLLOW-UP VISIT: CPT | Performed by: THORACIC SURGERY (CARDIOTHORACIC VASCULAR SURGERY)

## 2020-05-08 PROCEDURE — 82330 ASSAY OF CALCIUM: CPT | Performed by: PHYSICIAN ASSISTANT

## 2020-05-08 PROCEDURE — 82947 ASSAY GLUCOSE BLOOD QUANT: CPT

## 2020-05-08 PROCEDURE — 94010 BREATHING CAPACITY TEST: CPT

## 2020-05-08 PROCEDURE — 82803 BLOOD GASES ANY COMBINATION: CPT

## 2020-05-08 PROCEDURE — 83036 HEMOGLOBIN GLYCOSYLATED A1C: CPT | Performed by: FAMILY MEDICINE

## 2020-05-08 PROCEDURE — 93880 EXTRACRANIAL BILAT STUDY: CPT | Performed by: INTERNAL MEDICINE

## 2020-05-08 PROCEDURE — 80048 BASIC METABOLIC PNL TOTAL CA: CPT | Performed by: FAMILY MEDICINE

## 2020-05-08 PROCEDURE — 85520 HEPARIN ASSAY: CPT

## 2020-05-08 PROCEDURE — 93010 ELECTROCARDIOGRAM REPORT: CPT | Performed by: INTERNAL MEDICINE

## 2020-05-08 PROCEDURE — C1751 CATH, INF, PER/CENT/MIDLINE: HCPCS | Performed by: THORACIC SURGERY (CARDIOTHORACIC VASCULAR SURGERY)

## 2020-05-08 PROCEDURE — 93005 ELECTROCARDIOGRAM TRACING: CPT | Performed by: PHYSICIAN ASSISTANT

## 2020-05-08 PROCEDURE — 0213093 BYPASS CORONARY ARTERY, FOUR OR MORE ARTERIES FROM CORONARY ARTERY WITH AUTOLOGOUS VENOUS TISSUE, OPEN APPROACH: ICD-10-PCS | Performed by: THORACIC SURGERY (CARDIOTHORACIC VASCULAR SURGERY)

## 2020-05-08 PROCEDURE — 25010000002 ALBUMIN HUMAN 5% PER 50 ML: Performed by: PHYSICIAN ASSISTANT

## 2020-05-08 PROCEDURE — 85730 THROMBOPLASTIN TIME PARTIAL: CPT | Performed by: PHYSICIAN ASSISTANT

## 2020-05-08 PROCEDURE — 93005 ELECTROCARDIOGRAM TRACING: CPT | Performed by: NURSE PRACTITIONER

## 2020-05-08 PROCEDURE — 85027 COMPLETE CBC AUTOMATED: CPT | Performed by: PHYSICIAN ASSISTANT

## 2020-05-08 PROCEDURE — 33508 ENDOSCOPIC VEIN HARVEST: CPT | Performed by: THORACIC SURGERY (CARDIOTHORACIC VASCULAR SURGERY)

## 2020-05-08 PROCEDURE — 25010000002 PROTAMINE SULFATE PER 10 MG: Performed by: PHYSICIAN ASSISTANT

## 2020-05-08 PROCEDURE — 85610 PROTHROMBIN TIME: CPT | Performed by: PHYSICIAN ASSISTANT

## 2020-05-08 PROCEDURE — 94010 BREATHING CAPACITY TEST: CPT | Performed by: INTERNAL MEDICINE

## 2020-05-08 PROCEDURE — 25010000002 HEPARIN (PORCINE) PER 1000 UNITS: Performed by: ANESTHESIOLOGY

## 2020-05-08 RX ORDER — ONDANSETRON 2 MG/ML
4 INJECTION INTRAMUSCULAR; INTRAVENOUS EVERY 6 HOURS PRN
Status: DISCONTINUED | OUTPATIENT
Start: 2020-05-08 | End: 2020-05-13 | Stop reason: HOSPADM

## 2020-05-08 RX ORDER — DOPAMINE HYDROCHLORIDE 160 MG/100ML
2-20 INJECTION, SOLUTION INTRAVENOUS CONTINUOUS PRN
Status: DISCONTINUED | OUTPATIENT
Start: 2020-05-08 | End: 2020-05-10

## 2020-05-08 RX ORDER — NITROGLYCERIN 20 MG/100ML
INJECTION INTRAVENOUS CONTINUOUS PRN
Status: DISCONTINUED | OUTPATIENT
Start: 2020-05-08 | End: 2020-05-08 | Stop reason: SURG

## 2020-05-08 RX ORDER — NITROGLYCERIN 20 MG/100ML
5-200 INJECTION INTRAVENOUS CONTINUOUS PRN
Status: DISCONTINUED | OUTPATIENT
Start: 2020-05-08 | End: 2020-05-10

## 2020-05-08 RX ORDER — POTASSIUM CHLORIDE 1.5 G/1.77G
40 POWDER, FOR SOLUTION ORAL AS NEEDED
Status: DISCONTINUED | OUTPATIENT
Start: 2020-05-08 | End: 2020-05-11

## 2020-05-08 RX ORDER — PAPAVERINE HYDROCHLORIDE 30 MG/ML
INJECTION INTRAMUSCULAR; INTRAVENOUS AS NEEDED
Status: DISCONTINUED | OUTPATIENT
Start: 2020-05-08 | End: 2020-05-08 | Stop reason: HOSPADM

## 2020-05-08 RX ORDER — CEFUROXIME SODIUM 1.5 G/16ML
INJECTION, POWDER, FOR SOLUTION INTRAVENOUS AS NEEDED
Status: DISCONTINUED | OUTPATIENT
Start: 2020-05-08 | End: 2020-05-08 | Stop reason: SURG

## 2020-05-08 RX ORDER — POTASSIUM CHLORIDE 29.8 MG/ML
20 INJECTION INTRAVENOUS
Status: DISCONTINUED | OUTPATIENT
Start: 2020-05-08 | End: 2020-05-11

## 2020-05-08 RX ORDER — OXYCODONE HYDROCHLORIDE AND ACETAMINOPHEN 5; 325 MG/1; MG/1
2 TABLET ORAL EVERY 4 HOURS PRN
Status: DISCONTINUED | OUTPATIENT
Start: 2020-05-08 | End: 2020-05-13 | Stop reason: HOSPADM

## 2020-05-08 RX ORDER — PROTAMINE SULFATE 10 MG/ML
INJECTION, SOLUTION INTRAVENOUS
Status: DISPENSED
Start: 2020-05-08 | End: 2020-05-09

## 2020-05-08 RX ORDER — SODIUM CHLORIDE 9 MG/ML
30 INJECTION, SOLUTION INTRAVENOUS CONTINUOUS PRN
Status: DISCONTINUED | OUTPATIENT
Start: 2020-05-08 | End: 2020-05-08

## 2020-05-08 RX ORDER — NOREPINEPHRINE BITARTRATE 1 MG/ML
INJECTION, SOLUTION INTRAVENOUS CONTINUOUS PRN
Status: DISCONTINUED | OUTPATIENT
Start: 2020-05-08 | End: 2020-05-08 | Stop reason: SURG

## 2020-05-08 RX ORDER — SUFENTANIL CITRATE 50 UG/ML
INJECTION EPIDURAL; INTRAVENOUS AS NEEDED
Status: DISCONTINUED | OUTPATIENT
Start: 2020-05-08 | End: 2020-05-08 | Stop reason: SURG

## 2020-05-08 RX ORDER — PROTAMINE SULFATE 10 MG/ML
50 INJECTION, SOLUTION INTRAVENOUS ONCE
Status: COMPLETED | OUTPATIENT
Start: 2020-05-08 | End: 2020-05-08

## 2020-05-08 RX ORDER — CHLORHEXIDINE GLUCONATE 0.12 MG/ML
15 RINSE ORAL EVERY 12 HOURS SCHEDULED
Status: DISCONTINUED | OUTPATIENT
Start: 2020-05-08 | End: 2020-05-08

## 2020-05-08 RX ORDER — DEXMEDETOMIDINE HYDROCHLORIDE 4 UG/ML
.2-1.5 INJECTION, SOLUTION INTRAVENOUS CONTINUOUS PRN
Status: DISCONTINUED | OUTPATIENT
Start: 2020-05-08 | End: 2020-05-10

## 2020-05-08 RX ORDER — CALCIUM CHLORIDE 100 MG/ML
INJECTION INTRAVENOUS; INTRAVENTRICULAR AS NEEDED
Status: DISCONTINUED | OUTPATIENT
Start: 2020-05-08 | End: 2020-05-08 | Stop reason: SURG

## 2020-05-08 RX ORDER — ROCURONIUM BROMIDE 10 MG/ML
INJECTION, SOLUTION INTRAVENOUS AS NEEDED
Status: DISCONTINUED | OUTPATIENT
Start: 2020-05-08 | End: 2020-05-08 | Stop reason: SURG

## 2020-05-08 RX ORDER — ALBUTEROL SULFATE 2.5 MG/3ML
2.5 SOLUTION RESPIRATORY (INHALATION) EVERY 4 HOURS PRN
Status: DISCONTINUED | OUTPATIENT
Start: 2020-05-08 | End: 2020-05-09

## 2020-05-08 RX ORDER — NOREPINEPHRINE BIT/0.9 % NACL 8 MG/250ML
.02-.3 INFUSION BOTTLE (ML) INTRAVENOUS CONTINUOUS PRN
Status: DISCONTINUED | OUTPATIENT
Start: 2020-05-08 | End: 2020-05-11

## 2020-05-08 RX ORDER — POTASSIUM CHLORIDE, DEXTROSE MONOHYDRATE 150; 5 MG/100ML; G/100ML
30 INJECTION, SOLUTION INTRAVENOUS CONTINUOUS
Status: DISCONTINUED | OUTPATIENT
Start: 2020-05-08 | End: 2020-05-09

## 2020-05-08 RX ORDER — MEPERIDINE HYDROCHLORIDE 25 MG/ML
25 INJECTION INTRAMUSCULAR; INTRAVENOUS; SUBCUTANEOUS EVERY 4 HOURS PRN
Status: ACTIVE | OUTPATIENT
Start: 2020-05-08 | End: 2020-05-09

## 2020-05-08 RX ORDER — SODIUM CHLORIDE 0.9 % (FLUSH) 0.9 %
30 SYRINGE (ML) INJECTION ONCE AS NEEDED
Status: DISCONTINUED | OUTPATIENT
Start: 2020-05-08 | End: 2020-05-08

## 2020-05-08 RX ORDER — POTASSIUM CHLORIDE 750 MG/1
40 CAPSULE, EXTENDED RELEASE ORAL AS NEEDED
Status: DISCONTINUED | OUTPATIENT
Start: 2020-05-08 | End: 2020-05-11

## 2020-05-08 RX ORDER — DOBUTAMINE HYDROCHLORIDE 100 MG/100ML
2-20 INJECTION INTRAVENOUS CONTINUOUS PRN
Status: DISCONTINUED | OUTPATIENT
Start: 2020-05-08 | End: 2020-05-10

## 2020-05-08 RX ORDER — ALBUMIN, HUMAN INJ 5% 5 %
SOLUTION INTRAVENOUS
Status: DISPENSED
Start: 2020-05-08 | End: 2020-05-09

## 2020-05-08 RX ORDER — FENTANYL CITRATE 50 UG/ML
25 INJECTION, SOLUTION INTRAMUSCULAR; INTRAVENOUS
Status: DISCONTINUED | OUTPATIENT
Start: 2020-05-08 | End: 2020-05-10

## 2020-05-08 RX ORDER — HEPARIN SODIUM 1000 [USP'U]/ML
2200 INJECTION, SOLUTION INTRAVENOUS; SUBCUTANEOUS ONCE
Status: DISCONTINUED | OUTPATIENT
Start: 2020-05-08 | End: 2020-05-08

## 2020-05-08 RX ORDER — SODIUM CHLORIDE 9 MG/ML
INJECTION, SOLUTION INTRAVENOUS AS NEEDED
Status: DISCONTINUED | OUTPATIENT
Start: 2020-05-08 | End: 2020-05-08 | Stop reason: HOSPADM

## 2020-05-08 RX ORDER — AMINOCAPROIC ACID 250 MG/ML
INJECTION, SOLUTION INTRAVENOUS AS NEEDED
Status: DISCONTINUED | OUTPATIENT
Start: 2020-05-08 | End: 2020-05-08 | Stop reason: SURG

## 2020-05-08 RX ORDER — ALBUTEROL SULFATE 2.5 MG/3ML
2.5 SOLUTION RESPIRATORY (INHALATION) EVERY 4 HOURS PRN
Status: DISCONTINUED | OUTPATIENT
Start: 2020-05-08 | End: 2020-05-08

## 2020-05-08 RX ORDER — MIDAZOLAM HYDROCHLORIDE 1 MG/ML
INJECTION INTRAMUSCULAR; INTRAVENOUS AS NEEDED
Status: DISCONTINUED | OUTPATIENT
Start: 2020-05-08 | End: 2020-05-08 | Stop reason: SURG

## 2020-05-08 RX ORDER — ATORVASTATIN CALCIUM 40 MG/1
40 TABLET, FILM COATED ORAL NIGHTLY
Status: DISCONTINUED | OUTPATIENT
Start: 2020-05-08 | End: 2020-05-08 | Stop reason: SDUPTHER

## 2020-05-08 RX ORDER — HYDROCODONE BITARTRATE AND ACETAMINOPHEN 7.5; 325 MG/1; MG/1
1 TABLET ORAL EVERY 4 HOURS PRN
Status: DISCONTINUED | OUTPATIENT
Start: 2020-05-08 | End: 2020-05-13 | Stop reason: HOSPADM

## 2020-05-08 RX ORDER — CHLORHEXIDINE GLUCONATE 0.12 MG/ML
15 RINSE ORAL EVERY 12 HOURS SCHEDULED
Status: DISPENSED | OUTPATIENT
Start: 2020-05-08 | End: 2020-05-10

## 2020-05-08 RX ORDER — METOPROLOL TARTRATE 5 MG/5ML
2.5 INJECTION INTRAVENOUS EVERY 6 HOURS SCHEDULED
Status: DISPENSED | OUTPATIENT
Start: 2020-05-08 | End: 2020-05-09

## 2020-05-08 RX ORDER — FAMOTIDINE 20 MG/1
20 TABLET, FILM COATED ORAL ONCE
Status: COMPLETED | OUTPATIENT
Start: 2020-05-08 | End: 2020-05-08

## 2020-05-08 RX ORDER — MORPHINE SULFATE 2 MG/ML
2 INJECTION, SOLUTION INTRAMUSCULAR; INTRAVENOUS
Status: DISCONTINUED | OUTPATIENT
Start: 2020-05-08 | End: 2020-05-13 | Stop reason: HOSPADM

## 2020-05-08 RX ORDER — SODIUM CHLORIDE, SODIUM LACTATE, POTASSIUM CHLORIDE, CALCIUM CHLORIDE 600; 310; 30; 20 MG/100ML; MG/100ML; MG/100ML; MG/100ML
9 INJECTION, SOLUTION INTRAVENOUS CONTINUOUS
Status: DISCONTINUED | OUTPATIENT
Start: 2020-05-08 | End: 2020-05-08

## 2020-05-08 RX ORDER — NALOXONE HCL 0.4 MG/ML
0.4 VIAL (ML) INJECTION
Status: DISCONTINUED | OUTPATIENT
Start: 2020-05-08 | End: 2020-05-10

## 2020-05-08 RX ORDER — HEPARIN SODIUM 1000 [USP'U]/ML
INJECTION, SOLUTION INTRAVENOUS; SUBCUTANEOUS AS NEEDED
Status: DISCONTINUED | OUTPATIENT
Start: 2020-05-08 | End: 2020-05-08 | Stop reason: SURG

## 2020-05-08 RX ORDER — PROTAMINE SULFATE 10 MG/ML
INJECTION, SOLUTION INTRAVENOUS AS NEEDED
Status: DISCONTINUED | OUTPATIENT
Start: 2020-05-08 | End: 2020-05-08 | Stop reason: SURG

## 2020-05-08 RX ORDER — ALBUMIN, HUMAN INJ 5% 5 %
500 SOLUTION INTRAVENOUS AS NEEDED
Status: COMPLETED | OUTPATIENT
Start: 2020-05-08 | End: 2020-05-08

## 2020-05-08 RX ORDER — PHENYLEPHRINE HCL IN 0.9% NACL 0.5 MG/5ML
.5-3 SYRINGE (ML) INTRAVENOUS CONTINUOUS PRN
Status: DISCONTINUED | OUTPATIENT
Start: 2020-05-08 | End: 2020-05-10

## 2020-05-08 RX ADMIN — NITROGLYCERIN 0.1 MCG/KG/MIN: 20 INJECTION INTRAVENOUS at 11:32

## 2020-05-08 RX ADMIN — PROTAMINE SULFATE 400 MG: 10 INJECTION, SOLUTION INTRAVENOUS at 13:34

## 2020-05-08 RX ADMIN — POTASSIUM CHLORIDE AND DEXTROSE MONOHYDRATE 30 ML/HR: 150; 5 INJECTION, SOLUTION INTRAVENOUS at 14:45

## 2020-05-08 RX ADMIN — CALCIUM CHLORIDE 1 G: 100 INJECTION INTRAVENOUS; INTRAVENTRICULAR at 13:34

## 2020-05-08 RX ADMIN — MIDAZOLAM 5 MG: 1 INJECTION INTRAMUSCULAR; INTRAVENOUS at 12:10

## 2020-05-08 RX ADMIN — CEFUROXIME 1.5 G: 1.5 INJECTION, POWDER, FOR SOLUTION INTRAVENOUS at 13:34

## 2020-05-08 RX ADMIN — AMINOCAPROIC ACID 10 G: 250 INJECTION, SOLUTION INTRAVENOUS at 13:34

## 2020-05-08 RX ADMIN — SUFENTANIL CITRATE 150 MCG: 50 INJECTION, SOLUTION EPIDURAL; INTRAVENOUS at 10:49

## 2020-05-08 RX ADMIN — SUFENTANIL CITRATE 100 MCG: 50 INJECTION, SOLUTION EPIDURAL; INTRAVENOUS at 12:10

## 2020-05-08 RX ADMIN — HEPARIN SODIUM 30000 UNITS: 1000 INJECTION, SOLUTION INTRAVENOUS; SUBCUTANEOUS at 11:51

## 2020-05-08 RX ADMIN — ROCURONIUM BROMIDE 50 MG: 10 INJECTION INTRAVENOUS at 12:10

## 2020-05-08 RX ADMIN — MUPIROCIN 1 APPLICATION: 20 OINTMENT TOPICAL at 10:10

## 2020-05-08 RX ADMIN — CHLORHEXIDINE GLUCONATE 0.12% ORAL RINSE 15 ML: 1.2 LIQUID ORAL at 10:36

## 2020-05-08 RX ADMIN — NOREPINEPHRINE BITARTRATE 0.05 MCG/KG/MIN: 1 INJECTION, SOLUTION, CONCENTRATE INTRAVENOUS at 13:44

## 2020-05-08 RX ADMIN — INSULIN HUMAN 2.4 UNITS/HR: 1 INJECTION, SOLUTION INTRAVENOUS at 18:26

## 2020-05-08 RX ADMIN — ALBUMIN HUMAN 500 ML: 0.05 INJECTION, SOLUTION INTRAVENOUS at 15:24

## 2020-05-08 RX ADMIN — CEFUROXIME 1.5 G: 1.5 INJECTION, POWDER, FOR SOLUTION INTRAVENOUS at 10:49

## 2020-05-08 RX ADMIN — MIDAZOLAM 5 MG: 1 INJECTION INTRAMUSCULAR; INTRAVENOUS at 10:49

## 2020-05-08 RX ADMIN — ROCURONIUM BROMIDE 50 MG: 10 INJECTION INTRAVENOUS at 10:49

## 2020-05-08 RX ADMIN — SULFUR HEXAFLUORIDE 3 ML: KIT at 09:48

## 2020-05-08 RX ADMIN — OXYCODONE HYDROCHLORIDE AND ACETAMINOPHEN 2 TABLET: 5; 325 TABLET ORAL at 17:11

## 2020-05-08 RX ADMIN — CEFUROXIME SODIUM 1.5 G: 1.5 INJECTION, POWDER, FOR SOLUTION INTRAVENOUS at 21:34

## 2020-05-08 RX ADMIN — ATORVASTATIN CALCIUM 80 MG: 40 TABLET, FILM COATED ORAL at 21:33

## 2020-05-08 RX ADMIN — SODIUM CHLORIDE, PRESERVATIVE FREE 10 ML: 5 INJECTION INTRAVENOUS at 10:37

## 2020-05-08 RX ADMIN — SODIUM CHLORIDE, POTASSIUM CHLORIDE, SODIUM LACTATE AND CALCIUM CHLORIDE 9 ML/HR: 600; 310; 30; 20 INJECTION, SOLUTION INTRAVENOUS at 10:37

## 2020-05-08 RX ADMIN — ALBUMIN HUMAN 500 ML: 0.05 INJECTION, SOLUTION INTRAVENOUS at 18:51

## 2020-05-08 RX ADMIN — PROPOFOL 20 MCG/KG/MIN: 10 INJECTION, EMULSION INTRAVENOUS at 15:25

## 2020-05-08 RX ADMIN — PROTAMINE SULFATE 50 MG: 10 INJECTION, SOLUTION INTRAVENOUS at 14:45

## 2020-05-08 RX ADMIN — FAMOTIDINE 20 MG: 20 TABLET, FILM COATED ORAL at 10:36

## 2020-05-08 RX ADMIN — AMINOCAPROIC ACID 10 G: 250 INJECTION, SOLUTION INTRAVENOUS at 10:49

## 2020-05-08 RX ADMIN — Medication 0.06 MCG/KG/MIN: at 17:12

## 2020-05-08 RX ADMIN — METOPROLOL TARTRATE 1 MG: 5 INJECTION, SOLUTION INTRAVENOUS at 10:49

## 2020-05-09 ENCOUNTER — APPOINTMENT (OUTPATIENT)
Dept: GENERAL RADIOLOGY | Facility: HOSPITAL | Age: 70
End: 2020-05-09

## 2020-05-09 LAB
ALBUMIN SERPL-MCNC: 4.2 G/DL (ref 3.5–5.2)
ANION GAP SERPL CALCULATED.3IONS-SCNC: 13 MMOL/L (ref 5–15)
ANION GAP SERPL CALCULATED.3IONS-SCNC: 14 MMOL/L (ref 5–15)
BASOPHILS # BLD AUTO: 0.03 10*3/MM3 (ref 0–0.2)
BASOPHILS NFR BLD AUTO: 0.2 % (ref 0–1.5)
BH BB BLOOD EXPIRATION DATE: NORMAL
BH BB BLOOD TYPE BARCODE: 6200
BH BB DISPENSE STATUS: NORMAL
BH BB PRODUCT CODE: NORMAL
BH BB UNIT NUMBER: NORMAL
BUN BLD-MCNC: 12 MG/DL (ref 8–23)
BUN BLD-MCNC: 14 MG/DL (ref 8–23)
BUN/CREAT SERPL: 12.6 (ref 7–25)
BUN/CREAT SERPL: 13.6 (ref 7–25)
CALCIUM SPEC-SCNC: 8.9 MG/DL (ref 8.6–10.5)
CALCIUM SPEC-SCNC: 9 MG/DL (ref 8.6–10.5)
CHLORIDE SERPL-SCNC: 98 MMOL/L (ref 98–107)
CHLORIDE SERPL-SCNC: 99 MMOL/L (ref 98–107)
CO2 SERPL-SCNC: 24 MMOL/L (ref 22–29)
CO2 SERPL-SCNC: 26 MMOL/L (ref 22–29)
CREAT BLD-MCNC: 0.88 MG/DL (ref 0.76–1.27)
CREAT BLD-MCNC: 1.11 MG/DL (ref 0.76–1.27)
DEPRECATED RDW RBC AUTO: 49.7 FL (ref 37–54)
DEPRECATED RDW RBC AUTO: 52.8 FL (ref 37–54)
EOSINOPHIL # BLD AUTO: 0.01 10*3/MM3 (ref 0–0.4)
EOSINOPHIL NFR BLD AUTO: 0.1 % (ref 0.3–6.2)
ERYTHROCYTE [DISTWIDTH] IN BLOOD BY AUTOMATED COUNT: 14.5 % (ref 12.3–15.4)
ERYTHROCYTE [DISTWIDTH] IN BLOOD BY AUTOMATED COUNT: 14.7 % (ref 12.3–15.4)
GFR SERPL CREATININE-BSD FRML MDRD: 65 ML/MIN/1.73
GFR SERPL CREATININE-BSD FRML MDRD: 86 ML/MIN/1.73
GLUCOSE BLD-MCNC: 128 MG/DL (ref 65–99)
GLUCOSE BLD-MCNC: 156 MG/DL (ref 65–99)
GLUCOSE BLDC GLUCOMTR-MCNC: 104 MG/DL (ref 70–130)
GLUCOSE BLDC GLUCOMTR-MCNC: 106 MG/DL (ref 70–130)
GLUCOSE BLDC GLUCOMTR-MCNC: 110 MG/DL (ref 70–130)
GLUCOSE BLDC GLUCOMTR-MCNC: 116 MG/DL (ref 70–130)
GLUCOSE BLDC GLUCOMTR-MCNC: 117 MG/DL (ref 70–130)
GLUCOSE BLDC GLUCOMTR-MCNC: 117 MG/DL (ref 70–130)
GLUCOSE BLDC GLUCOMTR-MCNC: 123 MG/DL (ref 70–130)
GLUCOSE BLDC GLUCOMTR-MCNC: 124 MG/DL (ref 70–130)
GLUCOSE BLDC GLUCOMTR-MCNC: 138 MG/DL (ref 70–130)
GLUCOSE BLDC GLUCOMTR-MCNC: 147 MG/DL (ref 70–130)
GLUCOSE BLDC GLUCOMTR-MCNC: 151 MG/DL (ref 70–130)
GLUCOSE BLDC GLUCOMTR-MCNC: 156 MG/DL (ref 70–130)
GLUCOSE BLDC GLUCOMTR-MCNC: 162 MG/DL (ref 70–130)
HCT VFR BLD AUTO: 29.4 % (ref 37.5–51)
HCT VFR BLD AUTO: 36 % (ref 37.5–51)
HGB BLD-MCNC: 11.1 G/DL (ref 13–17.7)
HGB BLD-MCNC: 9.4 G/DL (ref 13–17.7)
IMM GRANULOCYTES # BLD AUTO: 0.07 10*3/MM3 (ref 0–0.05)
IMM GRANULOCYTES NFR BLD AUTO: 0.5 % (ref 0–0.5)
INR PPP: 1.21 (ref 0.85–1.16)
LYMPHOCYTES # BLD AUTO: 0.64 10*3/MM3 (ref 0.7–3.1)
LYMPHOCYTES NFR BLD AUTO: 4.9 % (ref 19.6–45.3)
MAGNESIUM SERPL-MCNC: 2.7 MG/DL (ref 1.6–2.4)
MAGNESIUM SERPL-MCNC: 2.8 MG/DL (ref 1.6–2.4)
MCH RBC QN AUTO: 30 PG (ref 26.6–33)
MCH RBC QN AUTO: 30.2 PG (ref 26.6–33)
MCHC RBC AUTO-ENTMCNC: 30.8 G/DL (ref 31.5–35.7)
MCHC RBC AUTO-ENTMCNC: 32 G/DL (ref 31.5–35.7)
MCV RBC AUTO: 93.9 FL (ref 79–97)
MCV RBC AUTO: 97.8 FL (ref 79–97)
MONOCYTES # BLD AUTO: 1.14 10*3/MM3 (ref 0.1–0.9)
MONOCYTES NFR BLD AUTO: 8.7 % (ref 5–12)
NEUTROPHILS # BLD AUTO: 11.26 10*3/MM3 (ref 1.7–7)
NEUTROPHILS NFR BLD AUTO: 85.6 % (ref 42.7–76)
NRBC BLD AUTO-RTO: 0 /100 WBC (ref 0–0.2)
PHOSPHATE SERPL-MCNC: 3.9 MG/DL (ref 2.5–4.5)
PLATELET # BLD AUTO: 191 10*3/MM3 (ref 140–450)
PLATELET # BLD AUTO: 251 10*3/MM3 (ref 140–450)
PMV BLD AUTO: 10 FL (ref 6–12)
PMV BLD AUTO: 10.3 FL (ref 6–12)
POTASSIUM BLD-SCNC: 4.2 MMOL/L (ref 3.5–5.2)
POTASSIUM BLD-SCNC: 4.4 MMOL/L (ref 3.5–5.2)
PROTHROMBIN TIME: 15 SECONDS (ref 11.5–14)
RBC # BLD AUTO: 3.13 10*6/MM3 (ref 4.14–5.8)
RBC # BLD AUTO: 3.68 10*6/MM3 (ref 4.14–5.8)
SODIUM BLD-SCNC: 136 MMOL/L (ref 136–145)
SODIUM BLD-SCNC: 138 MMOL/L (ref 136–145)
UNIT  ABO: NORMAL
UNIT  RH: NORMAL
WBC NRBC COR # BLD: 12.28 10*3/MM3 (ref 3.4–10.8)
WBC NRBC COR # BLD: 13.15 10*3/MM3 (ref 3.4–10.8)

## 2020-05-09 PROCEDURE — 99024 POSTOP FOLLOW-UP VISIT: CPT | Performed by: THORACIC SURGERY (CARDIOTHORACIC VASCULAR SURGERY)

## 2020-05-09 PROCEDURE — 82962 GLUCOSE BLOOD TEST: CPT

## 2020-05-09 PROCEDURE — 80069 RENAL FUNCTION PANEL: CPT | Performed by: THORACIC SURGERY (CARDIOTHORACIC VASCULAR SURGERY)

## 2020-05-09 PROCEDURE — 94799 UNLISTED PULMONARY SVC/PX: CPT

## 2020-05-09 PROCEDURE — 99233 SBSQ HOSP IP/OBS HIGH 50: CPT | Performed by: INTERNAL MEDICINE

## 2020-05-09 PROCEDURE — 85610 PROTHROMBIN TIME: CPT | Performed by: THORACIC SURGERY (CARDIOTHORACIC VASCULAR SURGERY)

## 2020-05-09 PROCEDURE — 25010000002 MORPHINE PER 10 MG: Performed by: THORACIC SURGERY (CARDIOTHORACIC VASCULAR SURGERY)

## 2020-05-09 PROCEDURE — 25010000003 CEFUROXIME SODIUM 1.5 G RECONSTITUTED SOLUTION: Performed by: PHYSICIAN ASSISTANT

## 2020-05-09 PROCEDURE — 83735 ASSAY OF MAGNESIUM: CPT | Performed by: THORACIC SURGERY (CARDIOTHORACIC VASCULAR SURGERY)

## 2020-05-09 PROCEDURE — 94640 AIRWAY INHALATION TREATMENT: CPT

## 2020-05-09 PROCEDURE — 93005 ELECTROCARDIOGRAM TRACING: CPT | Performed by: PHYSICIAN ASSISTANT

## 2020-05-09 PROCEDURE — 85027 COMPLETE CBC AUTOMATED: CPT | Performed by: THORACIC SURGERY (CARDIOTHORACIC VASCULAR SURGERY)

## 2020-05-09 PROCEDURE — 71045 X-RAY EXAM CHEST 1 VIEW: CPT

## 2020-05-09 PROCEDURE — 97162 PT EVAL MOD COMPLEX 30 MIN: CPT

## 2020-05-09 PROCEDURE — 80048 BASIC METABOLIC PNL TOTAL CA: CPT | Performed by: THORACIC SURGERY (CARDIOTHORACIC VASCULAR SURGERY)

## 2020-05-09 PROCEDURE — 85025 COMPLETE CBC W/AUTO DIFF WBC: CPT | Performed by: THORACIC SURGERY (CARDIOTHORACIC VASCULAR SURGERY)

## 2020-05-09 RX ORDER — DEXTROSE MONOHYDRATE 25 G/50ML
25 INJECTION, SOLUTION INTRAVENOUS
Status: DISCONTINUED | OUTPATIENT
Start: 2020-05-09 | End: 2020-05-13 | Stop reason: HOSPADM

## 2020-05-09 RX ORDER — ALBUTEROL SULFATE 2.5 MG/3ML
2.5 SOLUTION RESPIRATORY (INHALATION)
Status: DISCONTINUED | OUTPATIENT
Start: 2020-05-09 | End: 2020-05-13 | Stop reason: HOSPADM

## 2020-05-09 RX ORDER — NICOTINE POLACRILEX 4 MG
15 LOZENGE BUCCAL
Status: DISCONTINUED | OUTPATIENT
Start: 2020-05-09 | End: 2020-05-13 | Stop reason: HOSPADM

## 2020-05-09 RX ORDER — LATANOPROST 50 UG/ML
1 SOLUTION/ DROPS OPHTHALMIC NIGHTLY
COMMUNITY

## 2020-05-09 RX ORDER — PANTOPRAZOLE SODIUM 40 MG/1
40 TABLET, DELAYED RELEASE ORAL
Status: DISCONTINUED | OUTPATIENT
Start: 2020-05-09 | End: 2020-05-13 | Stop reason: HOSPADM

## 2020-05-09 RX ADMIN — ASPIRIN 81 MG: 81 TABLET, COATED ORAL at 08:22

## 2020-05-09 RX ADMIN — ALBUTEROL SULFATE 2.5 MG: 2.5 SOLUTION RESPIRATORY (INHALATION) at 18:44

## 2020-05-09 RX ADMIN — SODIUM CHLORIDE, PRESERVATIVE FREE 10 ML: 5 INJECTION INTRAVENOUS at 01:43

## 2020-05-09 RX ADMIN — SODIUM CHLORIDE, PRESERVATIVE FREE 10 ML: 5 INJECTION INTRAVENOUS at 08:23

## 2020-05-09 RX ADMIN — SODIUM CHLORIDE, PRESERVATIVE FREE 10 ML: 5 INJECTION INTRAVENOUS at 20:39

## 2020-05-09 RX ADMIN — CEFUROXIME SODIUM 1.5 G: 1.5 INJECTION, POWDER, FOR SOLUTION INTRAVENOUS at 05:07

## 2020-05-09 RX ADMIN — HYDROCODONE BITARTRATE AND ACETAMINOPHEN 1 TABLET: 7.5; 325 TABLET ORAL at 16:03

## 2020-05-09 RX ADMIN — ALBUTEROL SULFATE 2.5 MG: 2.5 SOLUTION RESPIRATORY (INHALATION) at 13:49

## 2020-05-09 RX ADMIN — CHLORHEXIDINE GLUCONATE 0.12% ORAL RINSE 15 ML: 1.2 LIQUID ORAL at 20:38

## 2020-05-09 RX ADMIN — OXYCODONE HYDROCHLORIDE AND ACETAMINOPHEN 2 TABLET: 5; 325 TABLET ORAL at 02:17

## 2020-05-09 RX ADMIN — HYDROCODONE BITARTRATE AND ACETAMINOPHEN 1 TABLET: 7.5; 325 TABLET ORAL at 11:59

## 2020-05-09 RX ADMIN — CEFUROXIME SODIUM 1.5 G: 1.5 INJECTION, POWDER, FOR SOLUTION INTRAVENOUS at 20:38

## 2020-05-09 RX ADMIN — CHLORHEXIDINE GLUCONATE 0.12% ORAL RINSE 15 ML: 1.2 LIQUID ORAL at 08:23

## 2020-05-09 RX ADMIN — OXYCODONE HYDROCHLORIDE AND ACETAMINOPHEN 2 TABLET: 5; 325 TABLET ORAL at 08:22

## 2020-05-09 RX ADMIN — MORPHINE SULFATE 2 MG: 2 INJECTION, SOLUTION INTRAMUSCULAR; INTRAVENOUS at 16:27

## 2020-05-09 RX ADMIN — ATORVASTATIN CALCIUM 80 MG: 40 TABLET, FILM COATED ORAL at 20:38

## 2020-05-09 RX ADMIN — METOPROLOL TARTRATE 12.5 MG: 25 TABLET, FILM COATED ORAL at 20:36

## 2020-05-09 RX ADMIN — PANTOPRAZOLE SODIUM 40 MG: 40 TABLET, DELAYED RELEASE ORAL at 11:59

## 2020-05-09 RX ADMIN — CEFUROXIME SODIUM 1.5 G: 1.5 INJECTION, POWDER, FOR SOLUTION INTRAVENOUS at 13:29

## 2020-05-09 RX ADMIN — HYDROCODONE BITARTRATE AND ACETAMINOPHEN 1 TABLET: 7.5; 325 TABLET ORAL at 20:38

## 2020-05-10 ENCOUNTER — APPOINTMENT (OUTPATIENT)
Dept: GENERAL RADIOLOGY | Facility: HOSPITAL | Age: 70
End: 2020-05-10

## 2020-05-10 LAB
ANION GAP SERPL CALCULATED.3IONS-SCNC: 10 MMOL/L (ref 5–15)
BASOPHILS # BLD AUTO: 0.02 10*3/MM3 (ref 0–0.2)
BASOPHILS NFR BLD AUTO: 0.2 % (ref 0–1.5)
BUN BLD-MCNC: 12 MG/DL (ref 8–23)
BUN/CREAT SERPL: 14.5 (ref 7–25)
CALCIUM SPEC-SCNC: 9 MG/DL (ref 8.6–10.5)
CHLORIDE SERPL-SCNC: 96 MMOL/L (ref 98–107)
CO2 SERPL-SCNC: 31 MMOL/L (ref 22–29)
CREAT BLD-MCNC: 0.83 MG/DL (ref 0.76–1.27)
DEPRECATED RDW RBC AUTO: 49.9 FL (ref 37–54)
DEPRECATED RDW RBC AUTO: 51.4 FL (ref 37–54)
EOSINOPHIL # BLD AUTO: 0.09 10*3/MM3 (ref 0–0.4)
EOSINOPHIL NFR BLD AUTO: 0.7 % (ref 0.3–6.2)
ERYTHROCYTE [DISTWIDTH] IN BLOOD BY AUTOMATED COUNT: 14.5 % (ref 12.3–15.4)
ERYTHROCYTE [DISTWIDTH] IN BLOOD BY AUTOMATED COUNT: 14.6 % (ref 12.3–15.4)
GFR SERPL CREATININE-BSD FRML MDRD: 92 ML/MIN/1.73
GLUCOSE BLD-MCNC: 159 MG/DL (ref 65–99)
GLUCOSE BLDC GLUCOMTR-MCNC: 157 MG/DL (ref 70–130)
GLUCOSE BLDC GLUCOMTR-MCNC: 171 MG/DL (ref 70–130)
GLUCOSE BLDC GLUCOMTR-MCNC: 175 MG/DL (ref 70–130)
GLUCOSE BLDC GLUCOMTR-MCNC: 177 MG/DL (ref 70–130)
HCT VFR BLD AUTO: 30.4 % (ref 37.5–51)
HCT VFR BLD AUTO: 32.7 % (ref 37.5–51)
HGB BLD-MCNC: 10.5 G/DL (ref 13–17.7)
HGB BLD-MCNC: 9.4 G/DL (ref 13–17.7)
IMM GRANULOCYTES # BLD AUTO: 0.09 10*3/MM3 (ref 0–0.05)
IMM GRANULOCYTES NFR BLD AUTO: 0.7 % (ref 0–0.5)
LYMPHOCYTES # BLD AUTO: 1.02 10*3/MM3 (ref 0.7–3.1)
LYMPHOCYTES NFR BLD AUTO: 7.9 % (ref 19.6–45.3)
MCH RBC QN AUTO: 29.7 PG (ref 26.6–33)
MCH RBC QN AUTO: 30.3 PG (ref 26.6–33)
MCHC RBC AUTO-ENTMCNC: 30.9 G/DL (ref 31.5–35.7)
MCHC RBC AUTO-ENTMCNC: 32.1 G/DL (ref 31.5–35.7)
MCV RBC AUTO: 94.5 FL (ref 79–97)
MCV RBC AUTO: 96.2 FL (ref 79–97)
MONOCYTES # BLD AUTO: 1.5 10*3/MM3 (ref 0.1–0.9)
MONOCYTES NFR BLD AUTO: 11.6 % (ref 5–12)
NEUTROPHILS # BLD AUTO: 10.23 10*3/MM3 (ref 1.7–7)
NEUTROPHILS NFR BLD AUTO: 78.9 % (ref 42.7–76)
NRBC BLD AUTO-RTO: 0 /100 WBC (ref 0–0.2)
PLATELET # BLD AUTO: 199 10*3/MM3 (ref 140–450)
PLATELET # BLD AUTO: 216 10*3/MM3 (ref 140–450)
PMV BLD AUTO: 9.6 FL (ref 6–12)
PMV BLD AUTO: 9.9 FL (ref 6–12)
POTASSIUM BLD-SCNC: 5.1 MMOL/L (ref 3.5–5.2)
RBC # BLD AUTO: 3.16 10*6/MM3 (ref 4.14–5.8)
RBC # BLD AUTO: 3.46 10*6/MM3 (ref 4.14–5.8)
SODIUM BLD-SCNC: 137 MMOL/L (ref 136–145)
WBC NRBC COR # BLD: 12.48 10*3/MM3 (ref 3.4–10.8)
WBC NRBC COR # BLD: 12.95 10*3/MM3 (ref 3.4–10.8)

## 2020-05-10 PROCEDURE — 71045 X-RAY EXAM CHEST 1 VIEW: CPT

## 2020-05-10 PROCEDURE — 85027 COMPLETE CBC AUTOMATED: CPT | Performed by: PHYSICIAN ASSISTANT

## 2020-05-10 PROCEDURE — 85025 COMPLETE CBC W/AUTO DIFF WBC: CPT | Performed by: PHYSICIAN ASSISTANT

## 2020-05-10 PROCEDURE — 25010000003 CEFUROXIME SODIUM 1.5 G RECONSTITUTED SOLUTION: Performed by: PHYSICIAN ASSISTANT

## 2020-05-10 PROCEDURE — 80048 BASIC METABOLIC PNL TOTAL CA: CPT | Performed by: PHYSICIAN ASSISTANT

## 2020-05-10 PROCEDURE — 93005 ELECTROCARDIOGRAM TRACING: CPT | Performed by: PHYSICIAN ASSISTANT

## 2020-05-10 PROCEDURE — 97530 THERAPEUTIC ACTIVITIES: CPT

## 2020-05-10 PROCEDURE — 99233 SBSQ HOSP IP/OBS HIGH 50: CPT | Performed by: INTERNAL MEDICINE

## 2020-05-10 PROCEDURE — 25010000002 FUROSEMIDE PER 20 MG: Performed by: PHYSICIAN ASSISTANT

## 2020-05-10 PROCEDURE — 94799 UNLISTED PULMONARY SVC/PX: CPT

## 2020-05-10 PROCEDURE — 25010000002 ONDANSETRON PER 1 MG: Performed by: PHYSICIAN ASSISTANT

## 2020-05-10 PROCEDURE — 82962 GLUCOSE BLOOD TEST: CPT

## 2020-05-10 PROCEDURE — 99024 POSTOP FOLLOW-UP VISIT: CPT | Performed by: THORACIC SURGERY (CARDIOTHORACIC VASCULAR SURGERY)

## 2020-05-10 RX ORDER — FUROSEMIDE 10 MG/ML
60 INJECTION INTRAMUSCULAR; INTRAVENOUS ONCE
Status: COMPLETED | OUTPATIENT
Start: 2020-05-10 | End: 2020-05-10

## 2020-05-10 RX ORDER — AMOXICILLIN 250 MG
2 CAPSULE ORAL 2 TIMES DAILY
Status: DISCONTINUED | OUTPATIENT
Start: 2020-05-10 | End: 2020-05-13 | Stop reason: HOSPADM

## 2020-05-10 RX ORDER — CLOPIDOGREL BISULFATE 75 MG/1
150 TABLET ORAL ONCE
Status: COMPLETED | OUTPATIENT
Start: 2020-05-10 | End: 2020-05-10

## 2020-05-10 RX ORDER — CLOPIDOGREL BISULFATE 75 MG/1
75 TABLET ORAL DAILY
Status: DISCONTINUED | OUTPATIENT
Start: 2020-05-11 | End: 2020-05-13 | Stop reason: HOSPADM

## 2020-05-10 RX ADMIN — ALBUTEROL SULFATE 2.5 MG: 2.5 SOLUTION RESPIRATORY (INHALATION) at 12:06

## 2020-05-10 RX ADMIN — ALBUTEROL SULFATE 2.5 MG: 2.5 SOLUTION RESPIRATORY (INHALATION) at 06:48

## 2020-05-10 RX ADMIN — POLYETHYLENE GLYCOL 3350 17 G: 17 POWDER, FOR SOLUTION ORAL at 11:18

## 2020-05-10 RX ADMIN — ATORVASTATIN CALCIUM 80 MG: 40 TABLET, FILM COATED ORAL at 20:35

## 2020-05-10 RX ADMIN — FUROSEMIDE 60 MG: 10 INJECTION, SOLUTION INTRAMUSCULAR; INTRAVENOUS at 08:08

## 2020-05-10 RX ADMIN — ALBUTEROL SULFATE 2.5 MG: 2.5 SOLUTION RESPIRATORY (INHALATION) at 00:29

## 2020-05-10 RX ADMIN — PANTOPRAZOLE SODIUM 40 MG: 40 TABLET, DELAYED RELEASE ORAL at 05:00

## 2020-05-10 RX ADMIN — ALBUTEROL SULFATE 2.5 MG: 2.5 SOLUTION RESPIRATORY (INHALATION) at 19:22

## 2020-05-10 RX ADMIN — METOPROLOL TARTRATE 12.5 MG: 25 TABLET, FILM COATED ORAL at 08:09

## 2020-05-10 RX ADMIN — ASPIRIN 81 MG: 81 TABLET, COATED ORAL at 08:09

## 2020-05-10 RX ADMIN — ONDANSETRON 4 MG: 2 INJECTION INTRAMUSCULAR; INTRAVENOUS at 04:58

## 2020-05-10 RX ADMIN — CEFUROXIME SODIUM 1.5 G: 1.5 INJECTION, POWDER, FOR SOLUTION INTRAVENOUS at 04:38

## 2020-05-10 RX ADMIN — CHLORHEXIDINE GLUCONATE 0.12% ORAL RINSE 15 ML: 1.2 LIQUID ORAL at 08:09

## 2020-05-10 RX ADMIN — ONDANSETRON 4 MG: 2 INJECTION INTRAMUSCULAR; INTRAVENOUS at 14:02

## 2020-05-10 RX ADMIN — SENNOSIDES AND DOCUSATE SODIUM 2 TABLET: 8.6; 5 TABLET ORAL at 11:18

## 2020-05-10 RX ADMIN — SENNOSIDES AND DOCUSATE SODIUM 2 TABLET: 8.6; 5 TABLET ORAL at 20:35

## 2020-05-10 RX ADMIN — METOPROLOL TARTRATE 12.5 MG: 25 TABLET, FILM COATED ORAL at 20:35

## 2020-05-10 RX ADMIN — SODIUM CHLORIDE, PRESERVATIVE FREE 10 ML: 5 INJECTION INTRAVENOUS at 08:10

## 2020-05-10 RX ADMIN — CLOPIDOGREL BISULFATE 150 MG: 75 TABLET ORAL at 08:08

## 2020-05-11 LAB
ACT BLD: 142 SECONDS (ref 82–152)
ANION GAP SERPL CALCULATED.3IONS-SCNC: 9 MMOL/L (ref 5–15)
BH BB BLOOD EXPIRATION DATE: NORMAL
BH BB BLOOD EXPIRATION DATE: NORMAL
BH BB BLOOD TYPE BARCODE: 600
BH BB BLOOD TYPE BARCODE: 600
BH BB DISPENSE STATUS: NORMAL
BH BB DISPENSE STATUS: NORMAL
BH BB PRODUCT CODE: NORMAL
BH BB PRODUCT CODE: NORMAL
BH BB UNIT NUMBER: NORMAL
BH BB UNIT NUMBER: NORMAL
BUN BLD-MCNC: 13 MG/DL (ref 8–23)
BUN/CREAT SERPL: 17.1 (ref 7–25)
CALCIUM SPEC-SCNC: 8.8 MG/DL (ref 8.6–10.5)
CHLORIDE SERPL-SCNC: 95 MMOL/L (ref 98–107)
CO2 SERPL-SCNC: 29 MMOL/L (ref 22–29)
CREAT BLD-MCNC: 0.76 MG/DL (ref 0.76–1.27)
CROSSMATCH INTERPRETATION: NORMAL
CROSSMATCH INTERPRETATION: NORMAL
DEPRECATED RDW RBC AUTO: 49.1 FL (ref 37–54)
ERYTHROCYTE [DISTWIDTH] IN BLOOD BY AUTOMATED COUNT: 14.4 % (ref 12.3–15.4)
GFR SERPL CREATININE-BSD FRML MDRD: 101 ML/MIN/1.73
GLUCOSE BLD-MCNC: 151 MG/DL (ref 65–99)
GLUCOSE BLDC GLUCOMTR-MCNC: 112 MG/DL (ref 70–130)
GLUCOSE BLDC GLUCOMTR-MCNC: 127 MG/DL (ref 70–130)
GLUCOSE BLDC GLUCOMTR-MCNC: 137 MG/DL (ref 70–130)
GLUCOSE BLDC GLUCOMTR-MCNC: 138 MG/DL (ref 70–130)
HCT VFR BLD AUTO: 31.3 % (ref 37.5–51)
HGB BLD-MCNC: 10.1 G/DL (ref 13–17.7)
MCH RBC QN AUTO: 30.1 PG (ref 26.6–33)
MCHC RBC AUTO-ENTMCNC: 32.3 G/DL (ref 31.5–35.7)
MCV RBC AUTO: 93.2 FL (ref 79–97)
PLATELET # BLD AUTO: 227 10*3/MM3 (ref 140–450)
PMV BLD AUTO: 9.7 FL (ref 6–12)
POTASSIUM BLD-SCNC: 4.2 MMOL/L (ref 3.5–5.2)
RBC # BLD AUTO: 3.36 10*6/MM3 (ref 4.14–5.8)
SODIUM BLD-SCNC: 133 MMOL/L (ref 136–145)
UNIT  ABO: NORMAL
UNIT  ABO: NORMAL
UNIT  RH: NORMAL
UNIT  RH: NORMAL
WBC NRBC COR # BLD: 12.68 10*3/MM3 (ref 3.4–10.8)

## 2020-05-11 PROCEDURE — 94799 UNLISTED PULMONARY SVC/PX: CPT

## 2020-05-11 PROCEDURE — 97530 THERAPEUTIC ACTIVITIES: CPT

## 2020-05-11 PROCEDURE — 85027 COMPLETE CBC AUTOMATED: CPT | Performed by: PHYSICIAN ASSISTANT

## 2020-05-11 PROCEDURE — 82962 GLUCOSE BLOOD TEST: CPT

## 2020-05-11 PROCEDURE — 99024 POSTOP FOLLOW-UP VISIT: CPT | Performed by: THORACIC SURGERY (CARDIOTHORACIC VASCULAR SURGERY)

## 2020-05-11 PROCEDURE — 25010000002 ONDANSETRON PER 1 MG: Performed by: PHYSICIAN ASSISTANT

## 2020-05-11 PROCEDURE — 80048 BASIC METABOLIC PNL TOTAL CA: CPT | Performed by: PHYSICIAN ASSISTANT

## 2020-05-11 RX ORDER — SODIUM CHLORIDE 0.9 % (FLUSH) 0.9 %
10 SYRINGE (ML) INJECTION EVERY 12 HOURS SCHEDULED
Status: DISCONTINUED | OUTPATIENT
Start: 2020-05-11 | End: 2020-05-13 | Stop reason: HOSPADM

## 2020-05-11 RX ORDER — SODIUM CHLORIDE 0.9 % (FLUSH) 0.9 %
10 SYRINGE (ML) INJECTION EVERY 8 HOURS SCHEDULED
Status: DISCONTINUED | OUTPATIENT
Start: 2020-05-11 | End: 2020-05-13 | Stop reason: HOSPADM

## 2020-05-11 RX ADMIN — SODIUM CHLORIDE, PRESERVATIVE FREE 10 ML: 5 INJECTION INTRAVENOUS at 23:08

## 2020-05-11 RX ADMIN — METOPROLOL TARTRATE 12.5 MG: 25 TABLET, FILM COATED ORAL at 23:03

## 2020-05-11 RX ADMIN — ALBUTEROL SULFATE 2.5 MG: 2.5 SOLUTION RESPIRATORY (INHALATION) at 07:32

## 2020-05-11 RX ADMIN — SODIUM CHLORIDE, PRESERVATIVE FREE 10 ML: 5 INJECTION INTRAVENOUS at 18:56

## 2020-05-11 RX ADMIN — METOPROLOL TARTRATE 12.5 MG: 25 TABLET, FILM COATED ORAL at 08:00

## 2020-05-11 RX ADMIN — CLOPIDOGREL BISULFATE 75 MG: 75 TABLET ORAL at 08:00

## 2020-05-11 RX ADMIN — ASPIRIN 81 MG: 81 TABLET, COATED ORAL at 08:00

## 2020-05-11 RX ADMIN — POLYETHYLENE GLYCOL 3350 17 G: 17 POWDER, FOR SOLUTION ORAL at 07:59

## 2020-05-11 RX ADMIN — PANTOPRAZOLE SODIUM 40 MG: 40 TABLET, DELAYED RELEASE ORAL at 05:59

## 2020-05-11 RX ADMIN — SENNOSIDES AND DOCUSATE SODIUM 2 TABLET: 8.6; 5 TABLET ORAL at 08:00

## 2020-05-11 RX ADMIN — ONDANSETRON 4 MG: 2 INJECTION INTRAMUSCULAR; INTRAVENOUS at 05:59

## 2020-05-11 RX ADMIN — SENNOSIDES AND DOCUSATE SODIUM 2 TABLET: 8.6; 5 TABLET ORAL at 23:02

## 2020-05-11 RX ADMIN — ATORVASTATIN CALCIUM 80 MG: 40 TABLET, FILM COATED ORAL at 23:03

## 2020-05-11 RX ADMIN — ALBUTEROL SULFATE 2.5 MG: 2.5 SOLUTION RESPIRATORY (INHALATION) at 18:59

## 2020-05-11 RX ADMIN — ALBUTEROL SULFATE 2.5 MG: 2.5 SOLUTION RESPIRATORY (INHALATION) at 12:59

## 2020-05-12 LAB
ANION GAP SERPL CALCULATED.3IONS-SCNC: 11 MMOL/L (ref 5–15)
BUN BLD-MCNC: 14 MG/DL (ref 8–23)
BUN/CREAT SERPL: 18.9 (ref 7–25)
CALCIUM SPEC-SCNC: 9 MG/DL (ref 8.6–10.5)
CHLORIDE SERPL-SCNC: 94 MMOL/L (ref 98–107)
CO2 SERPL-SCNC: 28 MMOL/L (ref 22–29)
CREAT BLD-MCNC: 0.74 MG/DL (ref 0.76–1.27)
GFR SERPL CREATININE-BSD FRML MDRD: 105 ML/MIN/1.73
GLUCOSE BLD-MCNC: 125 MG/DL (ref 65–99)
GLUCOSE BLDC GLUCOMTR-MCNC: 119 MG/DL (ref 70–130)
GLUCOSE BLDC GLUCOMTR-MCNC: 119 MG/DL (ref 70–130)
GLUCOSE BLDC GLUCOMTR-MCNC: 127 MG/DL (ref 70–130)
GLUCOSE BLDC GLUCOMTR-MCNC: 144 MG/DL (ref 70–130)
HCT VFR BLD AUTO: 29.8 % (ref 37.5–51)
HGB BLD-MCNC: 9.5 G/DL (ref 13–17.7)
POTASSIUM BLD-SCNC: 4.2 MMOL/L (ref 3.5–5.2)
SODIUM BLD-SCNC: 133 MMOL/L (ref 136–145)

## 2020-05-12 PROCEDURE — 25010000002 ONDANSETRON PER 1 MG: Performed by: PHYSICIAN ASSISTANT

## 2020-05-12 PROCEDURE — 82962 GLUCOSE BLOOD TEST: CPT

## 2020-05-12 PROCEDURE — 94799 UNLISTED PULMONARY SVC/PX: CPT

## 2020-05-12 PROCEDURE — 85014 HEMATOCRIT: CPT | Performed by: PHYSICIAN ASSISTANT

## 2020-05-12 PROCEDURE — 80048 BASIC METABOLIC PNL TOTAL CA: CPT | Performed by: PHYSICIAN ASSISTANT

## 2020-05-12 PROCEDURE — 85018 HEMOGLOBIN: CPT | Performed by: PHYSICIAN ASSISTANT

## 2020-05-12 PROCEDURE — 25010000002 FUROSEMIDE PER 20 MG: Performed by: PHYSICIAN ASSISTANT

## 2020-05-12 PROCEDURE — 99232 SBSQ HOSP IP/OBS MODERATE 35: CPT | Performed by: NURSE PRACTITIONER

## 2020-05-12 PROCEDURE — 99024 POSTOP FOLLOW-UP VISIT: CPT | Performed by: THORACIC SURGERY (CARDIOTHORACIC VASCULAR SURGERY)

## 2020-05-12 RX ORDER — POTASSIUM CHLORIDE 750 MG/1
20 CAPSULE, EXTENDED RELEASE ORAL ONCE
Status: COMPLETED | OUTPATIENT
Start: 2020-05-12 | End: 2020-05-12

## 2020-05-12 RX ORDER — FUROSEMIDE 10 MG/ML
40 INJECTION INTRAMUSCULAR; INTRAVENOUS ONCE
Status: COMPLETED | OUTPATIENT
Start: 2020-05-12 | End: 2020-05-12

## 2020-05-12 RX ADMIN — SODIUM CHLORIDE, PRESERVATIVE FREE 10 ML: 5 INJECTION INTRAVENOUS at 20:39

## 2020-05-12 RX ADMIN — HYDROCODONE BITARTRATE AND ACETAMINOPHEN 1 TABLET: 7.5; 325 TABLET ORAL at 06:56

## 2020-05-12 RX ADMIN — ALBUTEROL SULFATE 2.5 MG: 2.5 SOLUTION RESPIRATORY (INHALATION) at 19:01

## 2020-05-12 RX ADMIN — METOPROLOL TARTRATE 12.5 MG: 25 TABLET, FILM COATED ORAL at 08:27

## 2020-05-12 RX ADMIN — METOPROLOL TARTRATE 12.5 MG: 25 TABLET, FILM COATED ORAL at 20:36

## 2020-05-12 RX ADMIN — FUROSEMIDE 40 MG: 10 INJECTION, SOLUTION INTRAMUSCULAR; INTRAVENOUS at 08:31

## 2020-05-12 RX ADMIN — SENNOSIDES AND DOCUSATE SODIUM 2 TABLET: 8.6; 5 TABLET ORAL at 08:27

## 2020-05-12 RX ADMIN — ALBUTEROL SULFATE 2.5 MG: 2.5 SOLUTION RESPIRATORY (INHALATION) at 07:32

## 2020-05-12 RX ADMIN — PANTOPRAZOLE SODIUM 40 MG: 40 TABLET, DELAYED RELEASE ORAL at 06:56

## 2020-05-12 RX ADMIN — ATORVASTATIN CALCIUM 80 MG: 40 TABLET, FILM COATED ORAL at 20:35

## 2020-05-12 RX ADMIN — SODIUM CHLORIDE, PRESERVATIVE FREE 10 ML: 5 INJECTION INTRAVENOUS at 08:32

## 2020-05-12 RX ADMIN — ASPIRIN 81 MG: 81 TABLET, COATED ORAL at 08:27

## 2020-05-12 RX ADMIN — ONDANSETRON 4 MG: 2 INJECTION INTRAMUSCULAR; INTRAVENOUS at 09:43

## 2020-05-12 RX ADMIN — ALBUTEROL SULFATE 2.5 MG: 2.5 SOLUTION RESPIRATORY (INHALATION) at 00:31

## 2020-05-12 RX ADMIN — CLOPIDOGREL BISULFATE 75 MG: 75 TABLET ORAL at 08:27

## 2020-05-12 RX ADMIN — SODIUM CHLORIDE, PRESERVATIVE FREE 10 ML: 5 INJECTION INTRAVENOUS at 06:56

## 2020-05-12 RX ADMIN — ALBUTEROL SULFATE 2.5 MG: 2.5 SOLUTION RESPIRATORY (INHALATION) at 12:51

## 2020-05-12 RX ADMIN — POTASSIUM CHLORIDE 20 MEQ: 10 CAPSULE, COATED, EXTENDED RELEASE ORAL at 08:32

## 2020-05-13 ENCOUNTER — READMISSION MANAGEMENT (OUTPATIENT)
Dept: CALL CENTER | Facility: HOSPITAL | Age: 70
End: 2020-05-13

## 2020-05-13 ENCOUNTER — APPOINTMENT (OUTPATIENT)
Dept: GENERAL RADIOLOGY | Facility: HOSPITAL | Age: 70
End: 2020-05-13

## 2020-05-13 VITALS
WEIGHT: 164.7 LBS | RESPIRATION RATE: 16 BRPM | DIASTOLIC BLOOD PRESSURE: 78 MMHG | OXYGEN SATURATION: 97 % | HEART RATE: 104 BPM | BODY MASS INDEX: 27.44 KG/M2 | SYSTOLIC BLOOD PRESSURE: 123 MMHG | TEMPERATURE: 98.2 F | HEIGHT: 65 IN

## 2020-05-13 LAB
ANION GAP SERPL CALCULATED.3IONS-SCNC: 10 MMOL/L (ref 5–15)
BASOPHILS # BLD AUTO: 0.04 10*3/MM3 (ref 0–0.2)
BASOPHILS NFR BLD AUTO: 0.4 % (ref 0–1.5)
BUN BLD-MCNC: 16 MG/DL (ref 8–23)
BUN/CREAT SERPL: 22.2 (ref 7–25)
CALCIUM SPEC-SCNC: 9 MG/DL (ref 8.6–10.5)
CHLORIDE SERPL-SCNC: 94 MMOL/L (ref 98–107)
CO2 SERPL-SCNC: 28 MMOL/L (ref 22–29)
CREAT BLD-MCNC: 0.72 MG/DL (ref 0.76–1.27)
DEPRECATED RDW RBC AUTO: 49.4 FL (ref 37–54)
EOSINOPHIL # BLD AUTO: 0.3 10*3/MM3 (ref 0–0.4)
EOSINOPHIL NFR BLD AUTO: 3.2 % (ref 0.3–6.2)
ERYTHROCYTE [DISTWIDTH] IN BLOOD BY AUTOMATED COUNT: 14.5 % (ref 12.3–15.4)
GFR SERPL CREATININE-BSD FRML MDRD: 108 ML/MIN/1.73
GLUCOSE BLD-MCNC: 121 MG/DL (ref 65–99)
GLUCOSE BLDC GLUCOMTR-MCNC: 125 MG/DL (ref 70–130)
GLUCOSE BLDC GLUCOMTR-MCNC: 156 MG/DL (ref 70–130)
HCT VFR BLD AUTO: 31.9 % (ref 37.5–51)
HGB BLD-MCNC: 10.1 G/DL (ref 13–17.7)
IMM GRANULOCYTES # BLD AUTO: 0.07 10*3/MM3 (ref 0–0.05)
IMM GRANULOCYTES NFR BLD AUTO: 0.8 % (ref 0–0.5)
LYMPHOCYTES # BLD AUTO: 1 10*3/MM3 (ref 0.7–3.1)
LYMPHOCYTES NFR BLD AUTO: 10.8 % (ref 19.6–45.3)
MCH RBC QN AUTO: 29.5 PG (ref 26.6–33)
MCHC RBC AUTO-ENTMCNC: 31.7 G/DL (ref 31.5–35.7)
MCV RBC AUTO: 93.3 FL (ref 79–97)
MONOCYTES # BLD AUTO: 1.42 10*3/MM3 (ref 0.1–0.9)
MONOCYTES NFR BLD AUTO: 15.3 % (ref 5–12)
NEUTROPHILS # BLD AUTO: 6.47 10*3/MM3 (ref 1.7–7)
NEUTROPHILS NFR BLD AUTO: 69.5 % (ref 42.7–76)
NRBC BLD AUTO-RTO: 0 /100 WBC (ref 0–0.2)
PLATELET # BLD AUTO: 286 10*3/MM3 (ref 140–450)
PMV BLD AUTO: 9.3 FL (ref 6–12)
POTASSIUM BLD-SCNC: 4.3 MMOL/L (ref 3.5–5.2)
RBC # BLD AUTO: 3.42 10*6/MM3 (ref 4.14–5.8)
SODIUM BLD-SCNC: 132 MMOL/L (ref 136–145)
WBC NRBC COR # BLD: 9.3 10*3/MM3 (ref 3.4–10.8)

## 2020-05-13 PROCEDURE — 99232 SBSQ HOSP IP/OBS MODERATE 35: CPT | Performed by: NURSE PRACTITIONER

## 2020-05-13 PROCEDURE — 94799 UNLISTED PULMONARY SVC/PX: CPT

## 2020-05-13 PROCEDURE — 97530 THERAPEUTIC ACTIVITIES: CPT

## 2020-05-13 PROCEDURE — 99024 POSTOP FOLLOW-UP VISIT: CPT | Performed by: PHYSICIAN ASSISTANT

## 2020-05-13 PROCEDURE — 85025 COMPLETE CBC W/AUTO DIFF WBC: CPT | Performed by: NURSE PRACTITIONER

## 2020-05-13 PROCEDURE — 82962 GLUCOSE BLOOD TEST: CPT

## 2020-05-13 PROCEDURE — 99024 POSTOP FOLLOW-UP VISIT: CPT | Performed by: THORACIC SURGERY (CARDIOTHORACIC VASCULAR SURGERY)

## 2020-05-13 PROCEDURE — 71045 X-RAY EXAM CHEST 1 VIEW: CPT

## 2020-05-13 PROCEDURE — 80048 BASIC METABOLIC PNL TOTAL CA: CPT | Performed by: PHYSICIAN ASSISTANT

## 2020-05-13 RX ORDER — CLOPIDOGREL BISULFATE 75 MG/1
75 TABLET ORAL DAILY
Qty: 30 TABLET | Refills: 3 | Status: SHIPPED | OUTPATIENT
Start: 2020-05-13 | End: 2020-05-14

## 2020-05-13 RX ORDER — HYDROCODONE BITARTRATE AND ACETAMINOPHEN 7.5; 325 MG/1; MG/1
1 TABLET ORAL EVERY 6 HOURS PRN
Qty: 30 TABLET | Refills: 0
Start: 2020-05-13 | End: 2020-05-20

## 2020-05-13 RX ORDER — ASPIRIN 81 MG/1
81 TABLET ORAL DAILY
Qty: 30 TABLET | Refills: 2 | Status: SHIPPED | OUTPATIENT
Start: 2020-05-13 | End: 2020-05-14

## 2020-05-13 RX ORDER — ATORVASTATIN CALCIUM 80 MG/1
80 TABLET, FILM COATED ORAL NIGHTLY
Qty: 30 TABLET | Refills: 3 | Status: SHIPPED | OUTPATIENT
Start: 2020-05-13 | End: 2020-05-14

## 2020-05-13 RX ADMIN — SENNOSIDES AND DOCUSATE SODIUM 2 TABLET: 8.6; 5 TABLET ORAL at 08:19

## 2020-05-13 RX ADMIN — ASPIRIN 81 MG: 81 TABLET, COATED ORAL at 08:19

## 2020-05-13 RX ADMIN — ALBUTEROL SULFATE 2.5 MG: 2.5 SOLUTION RESPIRATORY (INHALATION) at 06:41

## 2020-05-13 RX ADMIN — CLOPIDOGREL BISULFATE 75 MG: 75 TABLET ORAL at 08:19

## 2020-05-13 RX ADMIN — METOPROLOL TARTRATE 12.5 MG: 25 TABLET, FILM COATED ORAL at 08:19

## 2020-05-13 RX ADMIN — PANTOPRAZOLE SODIUM 40 MG: 40 TABLET, DELAYED RELEASE ORAL at 06:54

## 2020-05-13 RX ADMIN — SODIUM CHLORIDE, PRESERVATIVE FREE 10 ML: 5 INJECTION INTRAVENOUS at 08:20

## 2020-05-13 RX ADMIN — ALBUTEROL SULFATE 2.5 MG: 2.5 SOLUTION RESPIRATORY (INHALATION) at 02:01

## 2020-05-14 ENCOUNTER — READMISSION MANAGEMENT (OUTPATIENT)
Dept: CALL CENTER | Facility: HOSPITAL | Age: 70
End: 2020-05-14

## 2020-05-14 ENCOUNTER — NURSE TRIAGE (OUTPATIENT)
Dept: CALL CENTER | Facility: HOSPITAL | Age: 70
End: 2020-05-14

## 2020-05-14 RX ORDER — CLOPIDOGREL BISULFATE 75 MG/1
75 TABLET ORAL DAILY
Qty: 30 TABLET | Refills: 3 | Status: SHIPPED | OUTPATIENT
Start: 2020-05-14 | End: 2020-09-02

## 2020-05-14 RX ORDER — ATORVASTATIN CALCIUM 80 MG/1
80 TABLET, FILM COATED ORAL NIGHTLY
Qty: 30 TABLET | Refills: 3 | Status: SHIPPED | OUTPATIENT
Start: 2020-05-14

## 2020-05-14 RX ORDER — ASPIRIN 81 MG/1
81 TABLET ORAL DAILY
Qty: 30 TABLET | Refills: 2 | Status: SHIPPED | OUTPATIENT
Start: 2020-05-14

## 2020-05-14 NOTE — OUTREACH NOTE
Prep Survey      Responses   [facility] facility patient discharged from?  [place]   Is LACE score < 7 ?  No   Eligibility  Readm Mgmt   Discharge diagnosis  Coronary artery bypass grafting x4    COVID-19 Test Status  Negative   Does the patient have one of the following disease processes/diagnoses(primary or secondary)?  Cardiothoracic surgery   Does the patient have Home health ordered?  No   Is there a DME ordered?  No   Prep survey completed?  Yes          Alejandra Martinez RN

## 2020-05-14 NOTE — TELEPHONE ENCOUNTER
"Recent CABG, since being home today has had 3 episodes of jerking , tongue to side, symptoms occurred while asleep and was jerking,  Did this last May and no other times. Suggested to go to the ED    Reason for Disposition  • Not on or ran out of seizure medicine (anticonvulsant)    Additional Information  • Negative: First seizure ever  • Negative: [1] Epileptic seizure (in adult with known epilepsy) AND [2] continues > 5 minutes  • Negative: [1] Two or more seizures AND [2] stays confused between seizures  • Negative: Bluish (or gray) lips or face now  • Negative: Head injury caused the seizure  • Negative: Pregnant  • Negative: Postpartum (from 0 to 6 weeks after delivery)  • Negative: Known poisoning or overdose  • Negative: Seizure in a swimming pool  • Negative: Has diabetes (diabetes mellitus)  • Negative: [1] Unresponsive (can't be awakened) after the seizure stops AND [2] persists > 5 minutes  • Negative: [1] Acting confused (e.g., disoriented, slurred speech) after the seizure stops AND [2] persists > 30 minutes  • Negative: Sounds like a life-threatening emergency to the triager  • Negative: Second seizure occurs on the same day  • Negative: Fever > 99.5 F (37.5 C)  • Negative: Severe headache    (Note: most seizure victims will have a headache after a seizure)  • Negative: High risk adult (e.g., alcohol or drug abuse)  • Negative: [1] Wants to sleep after the seizure AND [2] persists much longer than usual  • Negative: Epileptic seizures occur frequently (several per week)    Answer Assessment - Initial Assessment Questions  1. ONSET: \"How long did the seizure last?\" (Minutes)       ? Seizure,  Wife is unsure  2. CONTENT: \"Describe what happened during the seizure. Did the body become stiff? Was there any jerking?\"       Has jerking movements and does not seem to recognize any one  3. CIRCUMSTANCE: \"What was the individual doing when the seizure began?\"        Last year and has resume   4. MENTAL STATUS: " "\"Does he know who he is, who you are, and where he is?\"       alert  5. PRIOR SEIZURES: \"Has the individual had a seizure (convulsion) before?\" If so, ask: \"When was the last time?\" and \"What happened last time?\"      alert  6. EPILEPSY: \"Does the individual have epilepsy?\" (note: check for medical ID [facility])      no  7. MEDICATIONS: \"Does the individual take anticonvulsant medications?\" (e.g., yes/no, compliance, any recent changes)      no  8. INJURY: \"Did the individual hurt himself during the seizure?\" (e.g., head, tongue)      no  9. OTHER SYMPTOMS: \"Are there any other symptoms?\" (e.g., fever, headache)      no  10. PREGNANCY: \"Is there any chance you are pregnant?\" \"When was your last menstrual period?\"        no    Protocols used: SEIZURE-ADULT-AH      "

## 2020-05-14 NOTE — OUTREACH NOTE
CT Surgery Week 1 Survey      Responses   [facility] patient discharged from?  [place]   Does the patient have one of the following disease processes/diagnoses(primary or secondary)?  Cardiothoracic surgery   Is there a successful TCM telephone encounter documented?  No   Week 1 attempt successful?  Yes   Call start time  1301   Call end time  1315   Is patient permission given to speak with other caregiver?  Yes   List who call center can speak with  wife   Meds reviewed with patient/caregiver?  Yes   Is the patient having any side effects they believe may be caused by any medication additions or changes?  No   Does the patient have all medications related to this admission filled (includes all antibiotics, pain medications, cardiac medications, etc.)  Yes   Is the patient taking all medications as directed (includes completed medication regime)?  Yes   Comments regarding appointments  Pt has video conference coming up.   Does the patient have a primary care provider?   Yes   Comments regarding PCP  Cardiology will call pt for appointment.   Has home health visited the patient within 72 hours of discharge?  N/A   Psychosocial issues?  No   Did the patient receive a copy of their discharge instructions?  Yes   Nursing interventions  Reviewed instructions with patient   What is the patient's perception of their health status since discharge?  Improving   Nursing interventions  Nurse provided patient education   Is the patient/caregiver able to teach back normal signs of recovery?  Nausea and lack of appetite, Depression or irritability, Constipation, Pain or discomfort at incisional site   Is the patient /caregiver able to teach back basic post-op care?  Continue use of incentive spirometry at least 1 week post discharge, Practice 'cough and deep breath', Drive as instructed by MD in discharge instructions, Take showers only when approved by MD-sponge bathe until then, No tub bath, swimming, or hot tub until  instructed by MD, Keep incision areas clean, dry and protected, Do not remove steri-strips, Lifting as instructed by MD in discharge instructions   Is the patient/caregiver able to teach back signs and symptoms of incisional infection?  Increased redness, swelling or pain at the incisonal site, Increased drainage or bleeding, Incisional warmth, Pus or odor from incision, Fever   Is the patient/caregiver able to teach back steps to recovery at home?  Set small, achievable goals for return to baseline health, Rest and rebuild strength, gradually increase activity, Eat a well-balance diet, Make a list of questions for surgeon's appointment   Is the patient/caregiver able to teach back the hierarchy of who to call/visit for symptoms/problems? PCP, Specialist, Home health nurse, Urgent Care, ED, 911  Yes   Additional teach back comments  Pt's wife reports he has had periods of sleep apnea. The Md is aware and they are waiting on a retun call from them to find out what to do.   Week 1 call completed?  Yes            Lien Easley RN

## 2020-05-15 ENCOUNTER — NURSE TRIAGE (OUTPATIENT)
Dept: CALL CENTER | Facility: HOSPITAL | Age: 70
End: 2020-05-15

## 2020-05-15 ENCOUNTER — TELEPHONE (OUTPATIENT)
Dept: CARDIAC SURGERY | Facility: CLINIC | Age: 70
End: 2020-05-15

## 2020-05-15 NOTE — TELEPHONE ENCOUNTER
Called to report BP running low, patient went to the ER yesterday.  Asking if he should continue the Lopressor.  Advised and gave her Dr. Ojeda's office number and told to contact them.

## 2020-05-15 NOTE — TELEPHONE ENCOUNTER
"This pt was discharged 5/13 had CABG 5/8 was not on bp meds preop now sent home on Lopressor 12.5 mg bid, bp has been running low, the  nurse is calling Renetta from [facility]. BP now is 104/62, HR 97, but wife says has been keeping record and is running 90/60's and last night passed out, took to ER  Diagnosed with Vagel response with coughing, Wife states on monitor saw sys bp in 90's again. Told them to call Dr. Juarez CTS and have him evaluate the medications     Reason for Disposition  • [1] Systolic BP  AND [2] taking blood pressure medications AND [3] dizzy, lightheaded or weak    Additional Information  • Negative: Started suddenly after an allergic medicine, an allergic food, or bee sting  • Negative: Shock suspected (e.g., cold/pale/clammy skin, too weak to stand, low BP, rapid pulse)  • Negative: Difficult to awaken or acting confused (e.g., disoriented, slurred speech)  • Negative: Fainted  • Negative: [1] Systolic BP < 90 AND [2] dizzy, lightheaded, or weak  • Negative: Chest pain  • Negative: Bleeding (e.g., vomiting blood, rectal bleeding or tarry stools, severe vaginal bleeding)(Exception: fainted from sight of small amount of blood; small cut or abrasion)  • Negative: Extra heart beats or heart is beating fast  (i.e., \"palpitations\")  • Negative: Sounds like a life-threatening emergency to the triager  • Negative: [1] Systolic BP < 80 AND [2] NOT dizzy, lightheaded or weak  • Negative: Abdominal pain  • Negative: Fever > 100.5 F (38.1 C)  • Negative: Major surgery in the past month  • Negative: [1] Drinking very little AND [2] dehydration suspected (e.g., no urine > 12 hours, very dry mouth, very lightheaded)  • Negative: [1] Fall in systolic BP > 20 mm Hg from normal AND [2] dizzy, lightheaded, or weak  • Negative: Patient sounds very sick or weak to the triager  • Negative: [1] Systolic BP < 90 AND [2] NOT dizzy, lightheaded or weak  • Negative: [1] Systolic BP  AND [2] taking " "blood pressure medications AND [3] NOT dizzy, lightheaded or weak    Answer Assessment - Initial Assessment Questions  1. BLOOD PRESSURE: \"What is the blood pressure?\" \"Did you take at least two measurements 5 minutes apart?\"      running 90's/50-60's now 104/62  2. ONSET: \"When did you take your blood pressure?\"      Nurse took now  3. HOW: \"How did you obtain the blood pressure?\" (e.g., visiting nurse, automatic home BP monitor)      automatic  4. HISTORY: \"Do you have a history of low blood pressure?\" \"What is your blood pressure normally?\"      no  5. MEDICATIONS: \"Are you taking any medications for blood pressure?\" If yes: \"Have they been changed recently?\"      New med lopressor  6. PULSE RATE: \"Do you know what your pulse rate is?\"       97 HR  7. OTHER SYMPTOMS: \"Have you been sick recently?\" \"Have you had a recent injury?\"      Had CABG discharged 5/13, syncope last night  8. PREGNANCY: \"Is there any chance you are pregnant?\" \"When was your last menstrual period?\"      no    Protocols used: LOW BLOOD PRESSURE-ADULT-AH      "

## 2020-05-16 ENCOUNTER — HOSPITAL ENCOUNTER (OUTPATIENT)
Facility: HOSPITAL | Age: 70
Setting detail: OBSERVATION
Discharge: HOME | End: 2020-05-18
Attending: EMERGENCY MEDICINE | Admitting: HOSPITALIST

## 2020-05-16 ENCOUNTER — APPOINTMENT (OUTPATIENT)
Dept: GENERAL RADIOLOGY | Facility: HOSPITAL | Age: 70
End: 2020-05-16

## 2020-05-16 ENCOUNTER — APPOINTMENT (OUTPATIENT)
Dept: CT IMAGING | Facility: HOSPITAL | Age: 70
End: 2020-05-16

## 2020-05-16 ENCOUNTER — NURSE TRIAGE (OUTPATIENT)
Dept: CALL CENTER | Facility: HOSPITAL | Age: 70
End: 2020-05-16

## 2020-05-16 ENCOUNTER — READMISSION MANAGEMENT (OUTPATIENT)
Dept: CALL CENTER | Facility: HOSPITAL | Age: 70
End: 2020-05-16

## 2020-05-16 DIAGNOSIS — Z95.1 HISTORY OF FOUR VESSEL CORONARY ARTERY BYPASS GRAFT: ICD-10-CM

## 2020-05-16 DIAGNOSIS — R06.02 SHORTNESS OF BREATH: ICD-10-CM

## 2020-05-16 DIAGNOSIS — R55 COUGH SYNCOPE: Primary | ICD-10-CM

## 2020-05-16 DIAGNOSIS — Z86.79 HISTORY OF CORONARY ARTERY DISEASE: ICD-10-CM

## 2020-05-16 DIAGNOSIS — Z87.09 HISTORY OF COPD: ICD-10-CM

## 2020-05-16 DIAGNOSIS — R05.4 COUGH SYNCOPE: Primary | ICD-10-CM

## 2020-05-16 PROBLEM — D72.829 LEUKOCYTOSIS: Status: ACTIVE | Noted: 2020-05-16

## 2020-05-16 PROBLEM — E87.1 HYPONATREMIA: Status: ACTIVE | Noted: 2020-05-16

## 2020-05-16 PROBLEM — D64.9 ANEMIA: Status: ACTIVE | Noted: 2020-05-16

## 2020-05-16 PROBLEM — I21.4 NSTEMI (NON-ST ELEVATED MYOCARDIAL INFARCTION) (HCC): Status: RESOLVED | Noted: 2020-05-07 | Resolved: 2020-05-16

## 2020-05-16 LAB
ALBUMIN SERPL-MCNC: 3.9 G/DL (ref 3.5–5.2)
ALBUMIN/GLOB SERPL: 1.2 G/DL
ALP SERPL-CCNC: 55 U/L (ref 39–117)
ALT SERPL W P-5'-P-CCNC: 41 U/L (ref 1–41)
ANION GAP SERPL CALCULATED.3IONS-SCNC: 14 MMOL/L (ref 5–15)
AST SERPL-CCNC: 25 U/L (ref 1–40)
BASOPHILS # BLD AUTO: 0.03 10*3/MM3 (ref 0–0.2)
BASOPHILS NFR BLD AUTO: 0.3 % (ref 0–1.5)
BILIRUB SERPL-MCNC: 0.6 MG/DL (ref 0.2–1.2)
BUN BLD-MCNC: 13 MG/DL (ref 8–23)
BUN/CREAT SERPL: 15.9 (ref 7–25)
CALCIUM SPEC-SCNC: 9 MG/DL (ref 8.6–10.5)
CHLORIDE SERPL-SCNC: 92 MMOL/L (ref 98–107)
CO2 SERPL-SCNC: 23 MMOL/L (ref 22–29)
CREAT BLD-MCNC: 0.82 MG/DL (ref 0.76–1.27)
D DIMER PPP FEU-MCNC: 2.65 MCGFEU/ML (ref 0–0.56)
DEPRECATED RDW RBC AUTO: 50.4 FL (ref 37–54)
EOSINOPHIL # BLD AUTO: 0.26 10*3/MM3 (ref 0–0.4)
EOSINOPHIL NFR BLD AUTO: 2.3 % (ref 0.3–6.2)
ERYTHROCYTE [DISTWIDTH] IN BLOOD BY AUTOMATED COUNT: 15.1 % (ref 12.3–15.4)
GFR SERPL CREATININE-BSD FRML MDRD: 93 ML/MIN/1.73
GLOBULIN UR ELPH-MCNC: 3.2 GM/DL
GLUCOSE BLD-MCNC: 119 MG/DL (ref 65–99)
HCT VFR BLD AUTO: 33.4 % (ref 37.5–51)
HGB BLD-MCNC: 10.8 G/DL (ref 13–17.7)
HOLD SPECIMEN: NORMAL
HOLD SPECIMEN: NORMAL
IMM GRANULOCYTES # BLD AUTO: 0.07 10*3/MM3 (ref 0–0.05)
IMM GRANULOCYTES NFR BLD AUTO: 0.6 % (ref 0–0.5)
LYMPHOCYTES # BLD AUTO: 1.04 10*3/MM3 (ref 0.7–3.1)
LYMPHOCYTES NFR BLD AUTO: 9.1 % (ref 19.6–45.3)
MCH RBC QN AUTO: 29.8 PG (ref 26.6–33)
MCHC RBC AUTO-ENTMCNC: 32.3 G/DL (ref 31.5–35.7)
MCV RBC AUTO: 92 FL (ref 79–97)
MONOCYTES # BLD AUTO: 1.09 10*3/MM3 (ref 0.1–0.9)
MONOCYTES NFR BLD AUTO: 9.5 % (ref 5–12)
NEUTROPHILS # BLD AUTO: 8.98 10*3/MM3 (ref 1.7–7)
NEUTROPHILS NFR BLD AUTO: 78.2 % (ref 42.7–76)
NRBC BLD AUTO-RTO: 0 /100 WBC (ref 0–0.2)
NT-PROBNP SERPL-MCNC: 2325 PG/ML (ref 5–900)
PLATELET # BLD AUTO: 427 10*3/MM3 (ref 140–450)
PMV BLD AUTO: 8.7 FL (ref 6–12)
POTASSIUM BLD-SCNC: 4.1 MMOL/L (ref 3.5–5.2)
PROCALCITONIN SERPL-MCNC: 0.12 NG/ML (ref 0.1–0.25)
PROT SERPL-MCNC: 7.1 G/DL (ref 6–8.5)
RBC # BLD AUTO: 3.63 10*6/MM3 (ref 4.14–5.8)
SODIUM BLD-SCNC: 129 MMOL/L (ref 136–145)
TROPONIN T SERPL-MCNC: 0.12 NG/ML (ref 0–0.03)
TROPONIN T SERPL-MCNC: 0.13 NG/ML (ref 0–0.03)
WBC NRBC COR # BLD: 11.47 10*3/MM3 (ref 3.4–10.8)
WHOLE BLOOD HOLD SPECIMEN: NORMAL
WHOLE BLOOD HOLD SPECIMEN: NORMAL

## 2020-05-16 PROCEDURE — 71275 CT ANGIOGRAPHY CHEST: CPT

## 2020-05-16 PROCEDURE — 93005 ELECTROCARDIOGRAM TRACING: CPT | Performed by: PHYSICIAN ASSISTANT

## 2020-05-16 PROCEDURE — 85025 COMPLETE CBC W/AUTO DIFF WBC: CPT | Performed by: EMERGENCY MEDICINE

## 2020-05-16 PROCEDURE — 85379 FIBRIN DEGRADATION QUANT: CPT | Performed by: INTERNAL MEDICINE

## 2020-05-16 PROCEDURE — 0 IOPAMIDOL PER 1 ML: Performed by: EMERGENCY MEDICINE

## 2020-05-16 PROCEDURE — 99285 EMERGENCY DEPT VISIT HI MDM: CPT

## 2020-05-16 PROCEDURE — G0378 HOSPITAL OBSERVATION PER HR: HCPCS

## 2020-05-16 PROCEDURE — 84484 ASSAY OF TROPONIN QUANT: CPT | Performed by: PHYSICIAN ASSISTANT

## 2020-05-16 PROCEDURE — 83930 ASSAY OF BLOOD OSMOLALITY: CPT | Performed by: PHYSICIAN ASSISTANT

## 2020-05-16 PROCEDURE — 94640 AIRWAY INHALATION TREATMENT: CPT

## 2020-05-16 PROCEDURE — 93005 ELECTROCARDIOGRAM TRACING: CPT | Performed by: EMERGENCY MEDICINE

## 2020-05-16 PROCEDURE — 93010 ELECTROCARDIOGRAM REPORT: CPT | Performed by: INTERNAL MEDICINE

## 2020-05-16 PROCEDURE — 99220 PR INITIAL OBSERVATION CARE/DAY 70 MINUTES: CPT | Performed by: INTERNAL MEDICINE

## 2020-05-16 PROCEDURE — 84145 PROCALCITONIN (PCT): CPT | Performed by: INTERNAL MEDICINE

## 2020-05-16 PROCEDURE — 80053 COMPREHEN METABOLIC PANEL: CPT | Performed by: EMERGENCY MEDICINE

## 2020-05-16 PROCEDURE — 84484 ASSAY OF TROPONIN QUANT: CPT | Performed by: EMERGENCY MEDICINE

## 2020-05-16 PROCEDURE — 71045 X-RAY EXAM CHEST 1 VIEW: CPT

## 2020-05-16 PROCEDURE — 83880 ASSAY OF NATRIURETIC PEPTIDE: CPT | Performed by: EMERGENCY MEDICINE

## 2020-05-16 RX ORDER — HEPARIN SODIUM 5000 [USP'U]/ML
5000 INJECTION, SOLUTION INTRAVENOUS; SUBCUTANEOUS EVERY 12 HOURS SCHEDULED
Status: DISCONTINUED | OUTPATIENT
Start: 2020-05-16 | End: 2020-05-18 | Stop reason: HOSPADM

## 2020-05-16 RX ORDER — IPRATROPIUM BROMIDE AND ALBUTEROL SULFATE 2.5; .5 MG/3ML; MG/3ML
3 SOLUTION RESPIRATORY (INHALATION)
Status: DISCONTINUED | OUTPATIENT
Start: 2020-05-16 | End: 2020-05-17

## 2020-05-16 RX ORDER — ACETYLCYSTEINE 200 MG/ML
2 SOLUTION ORAL; RESPIRATORY (INHALATION)
Status: DISCONTINUED | OUTPATIENT
Start: 2020-05-16 | End: 2020-05-17

## 2020-05-16 RX ORDER — SODIUM CHLORIDE 0.9 % (FLUSH) 0.9 %
10 SYRINGE (ML) INJECTION AS NEEDED
Status: DISCONTINUED | OUTPATIENT
Start: 2020-05-16 | End: 2020-05-18 | Stop reason: HOSPADM

## 2020-05-16 RX ADMIN — IOPAMIDOL 65 ML: 755 INJECTION, SOLUTION INTRAVENOUS at 23:25

## 2020-05-16 RX ADMIN — IPRATROPIUM BROMIDE AND ALBUTEROL SULFATE 3 ML: .5; 3 SOLUTION RESPIRATORY (INHALATION) at 22:44

## 2020-05-16 NOTE — TELEPHONE ENCOUNTER
"He has had 4 episodes of coughing and passing out today.     Reason for Disposition  • Fainted 2 times in one day    Additional Information  • Negative: Still unconscious  • Negative: Difficult to awaken or acting confused (e.g., disoriented, slurred speech)  • Negative: Shock suspected (e.g., cold/pale/clammy skin, too weak to stand, low BP, rapid pulse)  • Negative: Difficulty breathing  • Negative: Bluish (or gray) lips or face now  • Negative: Chest pain  • Negative: Extra heart beats or heart is beating fast  (i.e.,\"palpitations\")  • Negative: Bleeding (e.g., vomiting blood, rectal bleeding or tarry stools, severe vaginal bleeding)(Exception: fainted from sight of small amount of blood; small cut or abrasion)  • Negative: Fainted suddenly after medicine, allergic food or bee sting  • Negative: Age > 50 years (Exception: occurred > 1 hour ago AND now feels completely fine)  • Negative: History of heart problems (e.g., congestive heart failure, heart attack)  • Negative: [1] Fainted > 15 minutes ago AND [2] still feels too weak or dizzy to stand  • Negative: Sounds like a life-threatening emergency to the triager  • Negative: [1] Has diabetes (diabetes mellitus) AND [2] fainting from low blood sugar (i.e., < 70 mg/dl or 3.9 mmol/l)  • Negative: Seizure suspected (e.g., muscle jerking or shaking followed by confusion)  • Negative: Heat exhaustion suspected (i.e., dehydration from heat exposure)  • Negative: [1] Fainted > 15 minutes ago AND [2] still looks pale (pale skin, pallor)  • Negative: [1] Fainted > 15 minutes ago AND [2] still feels weak or dizzy  • Negative: Occurred during exercise  • Negative: Any head or face injury  • Negative: Pregnant or possibly pregnant    Answer Assessment - Initial Assessment Questions  1. ONSET: \"How long were you unconscious?\" (minutes) \"When did it happen?\"      Few seconds  2. CONTENT: \"What happened during period of unconsciousness?\" (e.g., seizure activity)       Had " "jerking movements, eyes rolled in back of head, tongue hung out   3. MENTAL STATUS: \"Alert and oriented now?\" (oriented x 3 = name, month, location)       Yes   4. TRIGGER: \"What do you think caused the fainting?\" \"What were you doing just before you fainted?\"  (e.g., exercise, sudden standing up, prolonged standing)      Happens when he starts coughing   5. RECURRENT SYMPTOM: \"Have you ever passed out before?\" If so, ask: \"When was the last time?\" and \"What happened that time?\"       This week   6. INJURY: \"Did you sustain any injury during the fall?\"       No   7. CARDIAC SYMPTOMS: \"Have you had any of the following symptoms: chest pain, difficulty breathing, palpitations?\"      No   8. NEUROLOGIC SYMPTOMS: \"Have you had any of the following symptoms: headache, numbness, vertigo, weakness?\"      No   9. GI SYMPTOMS: \"Have you had any of the following symptoms: abdominal pain, vomiting, diarrhea, blood in stools?\"      No   10. OTHER SYMPTOMS: \"Do you have any other symptoms?\"        Recent CABG   11. PREGNANCY: \"Is there any chance you are pregnant?\" \"When was your last menstrual period?\"        No    Protocols used: FAINTING-ADULT-AH      "

## 2020-05-17 ENCOUNTER — APPOINTMENT (OUTPATIENT)
Dept: MRI IMAGING | Facility: HOSPITAL | Age: 70
End: 2020-05-17

## 2020-05-17 LAB
ANION GAP SERPL CALCULATED.3IONS-SCNC: 13 MMOL/L (ref 5–15)
B PARAPERT DNA SPEC QL NAA+PROBE: NOT DETECTED
B PERT DNA SPEC QL NAA+PROBE: NOT DETECTED
BILIRUB UR QL STRIP: NEGATIVE
BUN BLD-MCNC: 11 MG/DL (ref 8–23)
BUN/CREAT SERPL: 15.9 (ref 7–25)
C PNEUM DNA NPH QL NAA+NON-PROBE: NOT DETECTED
CALCIUM SPEC-SCNC: 8.9 MG/DL (ref 8.6–10.5)
CHLORIDE SERPL-SCNC: 99 MMOL/L (ref 98–107)
CLARITY UR: CLEAR
CO2 SERPL-SCNC: 20 MMOL/L (ref 22–29)
COLOR UR: YELLOW
CORTIS SERPL-MCNC: 6.98 MCG/DL
CREAT BLD-MCNC: 0.69 MG/DL (ref 0.76–1.27)
CREAT UR-MCNC: 30.9 MG/DL
DEPRECATED RDW RBC AUTO: 57.1 FL (ref 37–54)
EOSINOPHIL SPEC QL MICRO: 0 % EOS/100 CELLS (ref 0–0)
ERYTHROCYTE [DISTWIDTH] IN BLOOD BY AUTOMATED COUNT: 15.3 % (ref 12.3–15.4)
FLUAV H1 2009 PAND RNA NPH QL NAA+PROBE: NOT DETECTED
FLUAV H1 HA GENE NPH QL NAA+PROBE: NOT DETECTED
FLUAV H3 RNA NPH QL NAA+PROBE: NOT DETECTED
FLUAV SUBTYP SPEC NAA+PROBE: NOT DETECTED
FLUBV RNA ISLT QL NAA+PROBE: NOT DETECTED
GFR SERPL CREATININE-BSD FRML MDRD: 113 ML/MIN/1.73
GLUCOSE BLD-MCNC: 124 MG/DL (ref 65–99)
GLUCOSE UR STRIP-MCNC: NEGATIVE MG/DL
HADV DNA SPEC NAA+PROBE: NOT DETECTED
HCOV 229E RNA SPEC QL NAA+PROBE: NOT DETECTED
HCOV HKU1 RNA SPEC QL NAA+PROBE: NOT DETECTED
HCOV NL63 RNA SPEC QL NAA+PROBE: NOT DETECTED
HCOV OC43 RNA SPEC QL NAA+PROBE: NOT DETECTED
HCT VFR BLD AUTO: 36.9 % (ref 37.5–51)
HGB BLD-MCNC: 10.8 G/DL (ref 13–17.7)
HGB UR QL STRIP.AUTO: NEGATIVE
HMPV RNA NPH QL NAA+NON-PROBE: NOT DETECTED
HPIV1 RNA SPEC QL NAA+PROBE: NOT DETECTED
HPIV2 RNA SPEC QL NAA+PROBE: NOT DETECTED
HPIV3 RNA NPH QL NAA+PROBE: NOT DETECTED
HPIV4 P GENE NPH QL NAA+PROBE: NOT DETECTED
KETONES UR QL STRIP: NEGATIVE
LEUKOCYTE ESTERASE UR QL STRIP.AUTO: NEGATIVE
M PNEUMO IGG SER IA-ACNC: NOT DETECTED
MAGNESIUM SERPL-MCNC: 2.4 MG/DL (ref 1.6–2.4)
MCH RBC QN AUTO: 29.8 PG (ref 26.6–33)
MCHC RBC AUTO-ENTMCNC: 29.3 G/DL (ref 31.5–35.7)
MCV RBC AUTO: 101.7 FL (ref 79–97)
NITRITE UR QL STRIP: NEGATIVE
OSMOLALITY SERPL: 275 MOSM/KG (ref 275–295)
OSMOLALITY UR: 308 MOSM/KG (ref 300–1100)
PH UR STRIP.AUTO: 6 [PH] (ref 5–8)
PLATELET # BLD AUTO: 357 10*3/MM3 (ref 140–450)
PMV BLD AUTO: 8.7 FL (ref 6–12)
POTASSIUM BLD-SCNC: 4.7 MMOL/L (ref 3.5–5.2)
PROT UR QL STRIP: NEGATIVE
PROT UR-MCNC: <4 MG/DL
RBC # BLD AUTO: 3.63 10*6/MM3 (ref 4.14–5.8)
RHINOVIRUS RNA SPEC NAA+PROBE: NOT DETECTED
RSV RNA NPH QL NAA+NON-PROBE: NOT DETECTED
SODIUM BLD-SCNC: 132 MMOL/L (ref 136–145)
SODIUM UR-SCNC: 41 MMOL/L
SP GR UR STRIP: 1.03 (ref 1–1.03)
TROPONIN T SERPL-MCNC: 0.08 NG/ML (ref 0–0.03)
UROBILINOGEN UR QL STRIP: ABNORMAL
WBC NRBC COR # BLD: 10.07 10*3/MM3 (ref 3.4–10.8)

## 2020-05-17 PROCEDURE — G0378 HOSPITAL OBSERVATION PER HR: HCPCS

## 2020-05-17 PROCEDURE — 94799 UNLISTED PULMONARY SVC/PX: CPT

## 2020-05-17 PROCEDURE — 96372 THER/PROPH/DIAG INJ SC/IM: CPT

## 2020-05-17 PROCEDURE — 84156 ASSAY OF PROTEIN URINE: CPT | Performed by: PHYSICIAN ASSISTANT

## 2020-05-17 PROCEDURE — 81003 URINALYSIS AUTO W/O SCOPE: CPT | Performed by: PHYSICIAN ASSISTANT

## 2020-05-17 PROCEDURE — 80048 BASIC METABOLIC PNL TOTAL CA: CPT | Performed by: PHYSICIAN ASSISTANT

## 2020-05-17 PROCEDURE — A9577 INJ MULTIHANCE: HCPCS | Performed by: INTERNAL MEDICINE

## 2020-05-17 PROCEDURE — 82570 ASSAY OF URINE CREATININE: CPT | Performed by: PHYSICIAN ASSISTANT

## 2020-05-17 PROCEDURE — 96361 HYDRATE IV INFUSION ADD-ON: CPT

## 2020-05-17 PROCEDURE — 84300 ASSAY OF URINE SODIUM: CPT | Performed by: PHYSICIAN ASSISTANT

## 2020-05-17 PROCEDURE — 99204 OFFICE O/P NEW MOD 45 MIN: CPT | Performed by: INTERNAL MEDICINE

## 2020-05-17 PROCEDURE — 25010000002 METHYLPREDNISOLONE PER 40 MG: Performed by: INTERNAL MEDICINE

## 2020-05-17 PROCEDURE — 87070 CULTURE OTHR SPECIMN AEROBIC: CPT | Performed by: PHYSICIAN ASSISTANT

## 2020-05-17 PROCEDURE — 87205 SMEAR GRAM STAIN: CPT | Performed by: PHYSICIAN ASSISTANT

## 2020-05-17 PROCEDURE — 0100U HC BIOFIRE FILMARRAY RESP PANEL 2: CPT | Performed by: PHYSICIAN ASSISTANT

## 2020-05-17 PROCEDURE — 25010000002 HEPARIN (PORCINE) PER 1000 UNITS: Performed by: INTERNAL MEDICINE

## 2020-05-17 PROCEDURE — 99225 PR SBSQ OBSERVATION CARE/DAY 25 MINUTES: CPT | Performed by: INTERNAL MEDICINE

## 2020-05-17 PROCEDURE — 96374 THER/PROPH/DIAG INJ IV PUSH: CPT

## 2020-05-17 PROCEDURE — 83935 ASSAY OF URINE OSMOLALITY: CPT | Performed by: PHYSICIAN ASSISTANT

## 2020-05-17 PROCEDURE — 82533 TOTAL CORTISOL: CPT | Performed by: PHYSICIAN ASSISTANT

## 2020-05-17 PROCEDURE — 84484 ASSAY OF TROPONIN QUANT: CPT | Performed by: PHYSICIAN ASSISTANT

## 2020-05-17 PROCEDURE — 85027 COMPLETE CBC AUTOMATED: CPT | Performed by: PHYSICIAN ASSISTANT

## 2020-05-17 PROCEDURE — 0 GADOBENATE DIMEGLUMINE 529 MG/ML SOLUTION: Performed by: INTERNAL MEDICINE

## 2020-05-17 PROCEDURE — 83735 ASSAY OF MAGNESIUM: CPT | Performed by: PHYSICIAN ASSISTANT

## 2020-05-17 PROCEDURE — 70553 MRI BRAIN STEM W/O & W/DYE: CPT

## 2020-05-17 RX ORDER — IPRATROPIUM BROMIDE AND ALBUTEROL SULFATE 2.5; .5 MG/3ML; MG/3ML
3 SOLUTION RESPIRATORY (INHALATION) EVERY 4 HOURS PRN
Status: DISCONTINUED | OUTPATIENT
Start: 2020-05-17 | End: 2020-05-17

## 2020-05-17 RX ORDER — ACETAMINOPHEN 325 MG/1
650 TABLET ORAL EVERY 4 HOURS PRN
Status: DISCONTINUED | OUTPATIENT
Start: 2020-05-17 | End: 2020-05-18 | Stop reason: HOSPADM

## 2020-05-17 RX ORDER — ACETAMINOPHEN 650 MG/1
650 SUPPOSITORY RECTAL EVERY 4 HOURS PRN
Status: DISCONTINUED | OUTPATIENT
Start: 2020-05-17 | End: 2020-05-18 | Stop reason: HOSPADM

## 2020-05-17 RX ORDER — CLOPIDOGREL BISULFATE 75 MG/1
75 TABLET ORAL DAILY
Status: DISCONTINUED | OUTPATIENT
Start: 2020-05-17 | End: 2020-05-18 | Stop reason: HOSPADM

## 2020-05-17 RX ORDER — LATANOPROST 50 UG/ML
1 SOLUTION/ DROPS OPHTHALMIC NIGHTLY
Status: DISCONTINUED | OUTPATIENT
Start: 2020-05-17 | End: 2020-05-18 | Stop reason: HOSPADM

## 2020-05-17 RX ORDER — ASPIRIN 81 MG/1
81 TABLET ORAL DAILY
Status: DISCONTINUED | OUTPATIENT
Start: 2020-05-17 | End: 2020-05-18 | Stop reason: HOSPADM

## 2020-05-17 RX ORDER — SODIUM CHLORIDE 0.9 % (FLUSH) 0.9 %
10 SYRINGE (ML) INJECTION EVERY 12 HOURS SCHEDULED
Status: DISCONTINUED | OUTPATIENT
Start: 2020-05-17 | End: 2020-05-18 | Stop reason: HOSPADM

## 2020-05-17 RX ORDER — IPRATROPIUM BROMIDE AND ALBUTEROL SULFATE 2.5; .5 MG/3ML; MG/3ML
3 SOLUTION RESPIRATORY (INHALATION)
Status: DISCONTINUED | OUTPATIENT
Start: 2020-05-17 | End: 2020-05-18 | Stop reason: HOSPADM

## 2020-05-17 RX ORDER — ACETAMINOPHEN 160 MG/5ML
650 SOLUTION ORAL EVERY 4 HOURS PRN
Status: DISCONTINUED | OUTPATIENT
Start: 2020-05-17 | End: 2020-05-18 | Stop reason: HOSPADM

## 2020-05-17 RX ORDER — IPRATROPIUM BROMIDE AND ALBUTEROL SULFATE 2.5; .5 MG/3ML; MG/3ML
3 SOLUTION RESPIRATORY (INHALATION)
Status: DISCONTINUED | OUTPATIENT
Start: 2020-05-17 | End: 2020-05-17

## 2020-05-17 RX ORDER — SODIUM CHLORIDE 0.9 % (FLUSH) 0.9 %
10 SYRINGE (ML) INJECTION AS NEEDED
Status: DISCONTINUED | OUTPATIENT
Start: 2020-05-17 | End: 2020-05-18 | Stop reason: HOSPADM

## 2020-05-17 RX ORDER — ATORVASTATIN CALCIUM 40 MG/1
80 TABLET, FILM COATED ORAL NIGHTLY
Status: DISCONTINUED | OUTPATIENT
Start: 2020-05-17 | End: 2020-05-18 | Stop reason: HOSPADM

## 2020-05-17 RX ORDER — CHOLECALCIFEROL (VITAMIN D3) 125 MCG
5 CAPSULE ORAL NIGHTLY PRN
Status: DISCONTINUED | OUTPATIENT
Start: 2020-05-17 | End: 2020-05-18 | Stop reason: HOSPADM

## 2020-05-17 RX ORDER — GUAIFENESIN 600 MG/1
600 TABLET, EXTENDED RELEASE ORAL EVERY 12 HOURS SCHEDULED
Status: DISCONTINUED | OUTPATIENT
Start: 2020-05-17 | End: 2020-05-18 | Stop reason: HOSPADM

## 2020-05-17 RX ORDER — METHYLPREDNISOLONE SODIUM SUCCINATE 40 MG/ML
40 INJECTION, POWDER, LYOPHILIZED, FOR SOLUTION INTRAMUSCULAR; INTRAVENOUS ONCE
Status: COMPLETED | OUTPATIENT
Start: 2020-05-17 | End: 2020-05-17

## 2020-05-17 RX ADMIN — GUAIFENESIN 600 MG: 600 TABLET, EXTENDED RELEASE ORAL at 21:22

## 2020-05-17 RX ADMIN — HEPARIN SODIUM 5000 UNITS: 5000 INJECTION, SOLUTION INTRAVENOUS; SUBCUTANEOUS at 09:03

## 2020-05-17 RX ADMIN — IPRATROPIUM BROMIDE AND ALBUTEROL SULFATE 3 ML: 2.5; .5 SOLUTION RESPIRATORY (INHALATION) at 15:32

## 2020-05-17 RX ADMIN — SODIUM CHLORIDE, PRESERVATIVE FREE 10 ML: 5 INJECTION INTRAVENOUS at 09:04

## 2020-05-17 RX ADMIN — LATANOPROST 1 DROP: 50 SOLUTION OPHTHALMIC at 21:21

## 2020-05-17 RX ADMIN — SODIUM CHLORIDE 500 ML: 9 INJECTION, SOLUTION INTRAVENOUS at 00:43

## 2020-05-17 RX ADMIN — ATORVASTATIN CALCIUM 80 MG: 40 TABLET, FILM COATED ORAL at 21:22

## 2020-05-17 RX ADMIN — IPRATROPIUM BROMIDE AND ALBUTEROL SULFATE 3 ML: 2.5; .5 SOLUTION RESPIRATORY (INHALATION) at 18:58

## 2020-05-17 RX ADMIN — GUAIFENESIN 600 MG: 600 TABLET, EXTENDED RELEASE ORAL at 01:07

## 2020-05-17 RX ADMIN — SODIUM CHLORIDE, PRESERVATIVE FREE 10 ML: 5 INJECTION INTRAVENOUS at 00:51

## 2020-05-17 RX ADMIN — CLOPIDOGREL BISULFATE 75 MG: 75 TABLET ORAL at 09:03

## 2020-05-17 RX ADMIN — HEPARIN SODIUM 5000 UNITS: 5000 INJECTION, SOLUTION INTRAVENOUS; SUBCUTANEOUS at 21:21

## 2020-05-17 RX ADMIN — LATANOPROST 1 DROP: 50 SOLUTION OPHTHALMIC at 01:07

## 2020-05-17 RX ADMIN — ASPIRIN 81 MG: 81 TABLET, COATED ORAL at 09:03

## 2020-05-17 RX ADMIN — GADOBENATE DIMEGLUMINE 14 ML: 529 INJECTION, SOLUTION INTRAVENOUS at 21:30

## 2020-05-17 RX ADMIN — ATORVASTATIN CALCIUM 80 MG: 40 TABLET, FILM COATED ORAL at 00:42

## 2020-05-17 RX ADMIN — GUAIFENESIN 600 MG: 600 TABLET, EXTENDED RELEASE ORAL at 09:03

## 2020-05-17 RX ADMIN — HEPARIN SODIUM 5000 UNITS: 5000 INJECTION, SOLUTION INTRAVENOUS; SUBCUTANEOUS at 00:42

## 2020-05-17 RX ADMIN — METHYLPREDNISOLONE SODIUM SUCCINATE 40 MG: 40 INJECTION, POWDER, FOR SOLUTION INTRAMUSCULAR; INTRAVENOUS at 01:07

## 2020-05-17 RX ADMIN — IPRATROPIUM BROMIDE AND ALBUTEROL SULFATE 3 ML: 2.5; .5 SOLUTION RESPIRATORY (INHALATION) at 22:59

## 2020-05-17 NOTE — H&P
[facility] Medicine Services  HISTORY AND PHYSICAL    Patient Name: Garrett Mcwilliams  : 1950  MRN: [id number]  Primary Care Physician: Vaishali Massey APRN  Date of admission: 2020      Subjective   Subjective     Chief Complaint:  Shortness of breath     HPI:  Garrett Mcwilliams is a 69 y.o. male with a PMHx of CAD s/p CABG, stage 3 squamous cell cancer of the lung  and stage IV COPD presented to [facility] ED c/o shortness of breath. The patient was recently hospitalized at [facility] from - and underwent a CABG by Dr. Pizarro on 20, his postop course was uncomplicated. After returning home the patient reports mulitple episodes that start as a cough and then he has seizure like activity followed  By syncope and return of consciousness.  He is NOT incontinent during these episodes.    The patient was seen at an OSH ED on 20 for these syncopal episodes and diagnosed with vasovagal syncope. The patient reports 4 additional episodes today so he decided to be reevaluated. The patient reports that he does produce phlegm, he reports difficulty clearing it he also has had wheezing.  He denies chest pain or palpitations. He denies fever, chills or body aches. He denies sick contacts. He denies N/V/D. Remote tobacco use.  He reports he is not been very active as he gets short of breath with any exertion.  His blood pressures been low at home.    While in the ED, the patient's BP has been low. Labs revealed wbc 11.47, hgb 10.8, hct 33.4, proBNP 2,325, troponin 0.133, sodium 129. Chest x-ray demonstrates stable left-sided volume loss and extensive left apical pulmonary scarring. ECG normal sinus rhythm with low voltage. The patient received a neb treatment in the ED. He will be admitted to the hospitalist service for further medical management.     Review of Systems   Constitutional: Negative for chills, diaphoresis, fatigue and fever.   HENT: Negative for congestion, sore throat and trouble  swallowing.    Eyes: Negative for pain and visual disturbance.   Respiratory: Positive for cough (productive) and shortness of breath. Negative for wheezing.    Cardiovascular: Negative for chest pain, palpitations and leg swelling.   Gastrointestinal: Negative for abdominal pain, blood in stool, constipation, diarrhea, nausea and vomiting.   Genitourinary: Negative for difficulty urinating and dysuria.   Musculoskeletal: Negative for back pain and gait problem.   Skin: Negative for rash and wound.   Neurological: Positive for syncope. Negative for dizziness, speech difficulty, weakness and headaches.   Hematological: Negative for adenopathy. Does not bruise/bleed easily.   Psychiatric/Behavioral: Negative for agitation and confusion.      All other systems reviewed and are negative.     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    Patient has completed radiation therapy in January of this year and then he started Keytruda which he has had every couple weeks since then for his stage III squamous cell cancer of the lung    Past Surgical History:   Procedure Laterality Date   • CORONARY ARTERY BYPASS GRAFT N/A 5/8/2020    Procedure: MEDIAN STERNOTOMY, CORONARY ARTERY BYPASS GRAFT  X4 , ENDOSCOPIC VEIN HARVESTING OF LEFT GREATER SAPHENOUS VEIN , EVH EXPLORATION OF RIGH TLEG;  Surgeon: Rony Pizarro MD;  Location: [facility];  Service: Cardiothoracic;  Laterality: N/A;  vo 1200  vr 1215   • PORTACATH PLACEMENT         Family History: family history includes Aneurysm in his father. Otherwise pertinent FHx was reviewed and unremarkable.     Social History:  reports that he quit smoking about 30 years ago. His  smoking use included cigarettes. He has a 105.00 pack-year smoking history. He has never used smokeless tobacco. He reports that he does not drink alcohol or use drugs.  Social History     Social History Narrative   • Not on file   He is , he is a retired .    Medications:  Available home medication information reviewed.  Prior to Admission medications    Medication Sig Start Date End Date Taking? Authorizing Provider   aspirin 81 MG EC tablet Take 1 tablet by mouth Daily. 5/14/20   Dutch Hernández PA   atorvastatin (LIPITOR) 80 MG tablet Take 1 tablet by mouth Every Night. 5/14/20   Ady Lynn PA   clopidogrel (PLAVIX) 75 MG tablet Take 1 tablet by mouth Daily. 5/14/20   Dutch Hernández PA   HYDROcodone-acetaminophen (NORCO) 7.5-325 MG per tablet Take 1 tablet by mouth Every 6 (Six) Hours As Needed for Moderate Pain  for up to 5 days. 5/13/20 5/20/20  Rony Pizarro MD   latanoprost (XALATAN) 0.005 % ophthalmic solution Administer 1 drop to both eyes Every Night.    Provider, MD Marsha   Loratadine (CLARITIN PO) Take 10 mg by mouth Daily As Needed (allergies).    Provider, MD Marsha   metoprolol tartrate (LOPRESSOR) 25 MG tablet Take 0.5 tablets by mouth Every 12 (Twelve) Hours. 5/14/20   Ady Lynn PA       No Known Allergies    Objective   Objective     Vital Signs:   Temp:  [97.8 °F (36.6 °C)] 97.8 °F (36.6 °C)  Heart Rate:  [] 94  Resp:  [20] 20  BP: (100-133)/(70-81) 100/70      94% on room air  Physical Exam   Patient is alert and talkative in no distress at rest he is quite alert and nontoxic although audibly wheezing.  Neck is without mass or JVD  Heart is Reg wo murmur, distant  Lungs are clear wo wheeze or crackle on the right however he does have inspiratory and expiratory wheeze on the left with decreased breath sounds on the left  His chest has a clean dry mid line incision as well as his mediastinal tube sites are clean without erythema.  Abd  is soft without HSM or mass, not tender or distended  MAEW  Skin is without rash or edema  Neurologic exam in nonfocal   Mood is appropriate      Results Reviewed:  I have personally reviewed current lab and radiology data.    Results from last 7 days   Lab Units 05/16/20 2016   WBC 10*3/mm3 11.47*   HEMOGLOBIN g/dL 10.8*   HEMATOCRIT % 33.4*   PLATELETS 10*3/mm3 427     Results from last 7 days   Lab Units 05/16/20 2016   SODIUM mmol/L 129*   POTASSIUM mmol/L 4.1   CHLORIDE mmol/L 92*   CO2 mmol/L 23.0   BUN mg/dL 13   CREATININE mg/dL 0.82   GLUCOSE mg/dL 119*   CALCIUM mg/dL 9.0   ALT (SGPT) U/L 41   AST (SGOT) U/L 25   TROPONIN T ng/mL 0.133*   PROBNP pg/mL 2,325.0*     Estimated Creatinine Clearance: 82.3 mL/min (by C-G formula based on SCr of 0.82 mg/dL).  Brief Urine Lab Results     None        Imaging Results (Last 24 Hours)     Procedure Component Value Units Date/Time    XR Chest 1 View [[id number]] Collected:  05/16/20 2057     Updated:  05/16/20 2059    Narrative:       CR Chest 1 Vw    INDICATION:   69-year-old male with shortness of air and cough today. Multiple syncopal episodes today.     COMPARISON:    Chest 5/13/2020    FINDINGS:  Single portable AP view(s) of the chest.  Chronic left-sided volume loss and extensive left apical pulmonary scarring. Right lung clear. No pneumothorax area no large pleural effusions. Heart size and mediastinum appear stable. Median sternotomy wires.  Right-sided Port-A-Cath again noted with catheter extending cephalad in the right internal jugular vein.      Impression:         1. No acute chest findings. Stable left-sided volume loss and extensive left apical pulmonary scarring.  2. Right-sided Port-A-Cath again noted with catheter extending cephalad in the right internal jugular vein.    Signer Name: Leandro Messina MD   Signed: 5/16/2020 8:57 PM   Workstation Name: [id number]    [facility]        Results for orders placed during the  hospital encounter of 05/07/20   Adult Transthoracic Echocardiogram Complete    Narrative · The following left ventricular wall segments are hypokinetic: apical   anterior and apex hypokinetic.  · Estimated EF = 60%.  · Left ventricular systolic function is normal.  · Left ventricular diastolic dysfunction (grade I) consistent with   impaired relaxation.  · Mild tricuspid valve regurgitation is present.  · There is no evidence of a left ventricular mass or thrombus present.  · Normal right ventricular cavity size, wall thickness, systolic function   and septal motion noted.  · The aortic valve exhibits mild sclerosis.  · No evidence of pulmonary hypertension is present.  · There is no evidence of pericardial effusion.        Assessment/Plan   Assessment & Plan     Active Hospital Problems    Diagnosis POA   • **Shortness of breath [R06.02] Yes   • Recurrent syncope [R55] Yes     Priority: High   • Stage IV COPD, with FEV1 of 38% [J43.9] Yes     Priority: High   • Hyponatremia [E87.1] Yes   • Anemia [D64.9] Yes   • Leukocytosis [D72.829] Yes   • Squamous cell carcinoma lung, left status post XRT [C34.92] Yes   • Coronary artery disease involving native coronary artery of native heart status post CABG x4 5/8/2020 [I25.10] Yes     Mr. Mcwilliams is a 69 y.o. male with a PMHx of CAD s/p CABG, lung cancer and COPD presented to [facility] ED c/o shortness of breath with productive cough and syncope for several days. Underwent recent CABG by Dr. Pizarro on 5/8.     Shortness of breath / Cough   -- chest x-ray demonstrates stable left-sided volume loss and extensive left apical pulmonary scarring  -- obtain CTA of the chest to rule out tumor progression, mucous plugging of the left bronchus, or other obstructive issues.  Also rule out pericardial effusion.  -- respiratory panel and sputum culture   -- tested negative for COVID-19 on 5/7/20  -- monitor pulse ox   -- CT is negative for any acute changes-- will try mucinex, one dose of  steroids- consider increasng inhaled medications at discharge-    Syncope  -- post tussive syncope likely vasovagal   -- ECG normal sinus with low voltage   -- echo 5/8/20 LVEF 60% with grade 1 diastolic dysfunction and apical and apex hypokinesis   -- carotid duplex 5/8/20 unremarkable  -- consider cardiology consult given recent CABG if nothing is found by initial studies.  -- orthostatic vitals, monitor telemetry, fall precautions   -- consider EEG and further neuro eval if not better with better pulm toilet    Hyponatremia with hyper tension  -- sodium 129, down from 132 on 5/13  -- obtain urine studies, avoid diuretics  -We will give normal saline 500    Leukocytosis-most likely stress response  -- obtain urinalysis and CT chest     Anemia  -- improved, monitor closely     CAD s/p CABG (5/8/20)  -- followed by CT surgeon, Dr. Pizarro   -- continue ASA, Plavix, Statin   -- hold metoprolol d/t low BP     COPD  -- nebs prn need COPD education.  His wife reports that no one is ever told him he has COPD.    DVT prophylaxis:  SCDs    Admission Communication  Due to current limited visitation policies, an attempt will be made to update patient's identified best point-of-contact(s) at admission to discuss plan of care.   I spoke with his wife at length by phone who provided some of the history.  She is can go home tonight and if she is at home her cell phone does not work and appreciates is calling her home phone.              CODE STATUS:    Code Status and Medical Interventions:   Ordered at: 05/16/20 6747     Level Of Support Discussed With:    Patient     Code Status:    CPR     Medical Interventions (Level of Support Prior to Arrest):    Full     Admission Status:  I believe this patient meets OBSERVATION status, however if further evaluation or treatment plans warrant, status may change.  Based upon current information, I predict patient's care encounter to be less than or equal to 2 midnights.  History  initiated and Electronically signed by Sofi Macias PA-C, 05/16/20, 10:31 PM.  Further history, exam and plan by Electronically signed by Starla Humphreys MD, 05/16/20, 11:54 PM.

## 2020-05-17 NOTE — PLAN OF CARE
Problem: Patient Care Overview  Goal: Plan of Care Review  Outcome: Ongoing (interventions implemented as appropriate)  Goal: Individualization and Mutuality  Outcome: Ongoing (interventions implemented as appropriate)  Goal: Discharge Needs Assessment  Outcome: Ongoing (interventions implemented as appropriate)  Goal: Interprofessional Rounds/Family Conf  Outcome: Ongoing (interventions implemented as appropriate)     Problem: Fall Risk (Adult)  Goal: Identify Related Risk Factors and Signs and Symptoms  Outcome: Ongoing (interventions implemented as appropriate)  Goal: Absence of Fall  Outcome: Ongoing (interventions implemented as appropriate)     Problem: Syncope (Adult)  Goal: Identify Related Risk Factors and Signs and Symptoms  Outcome: Ongoing (interventions implemented as appropriate)  Goal: Physical Safety/Health Maintenance  Outcome: Ongoing (interventions implemented as appropriate)  Goal: Optimal Emotional/Functional [place]  Outcome: Ongoing (interventions implemented as appropriate)       Denies dizziness and pain today.  Pt. Slept on and off most of afternoon.  Brain MRI ordered.  Monitoring.

## 2020-05-17 NOTE — PLAN OF CARE
Problem: Patient Care Overview  Goal: Plan of Care Review  Outcome: Ongoing (interventions implemented as appropriate)  Flowsheets (Taken 5/17/2020 0219)  Progress: no change  Plan of Care Reviewed With: patient  Outcome Summary: Pt VSS. RA. No complaints of pain or SOB. Pt has midline chest incision from CABG a week ago. Incision on bilateral inside knees. Pt is resting. Will continue to monitor.     Problem: Fall Risk (Adult)  Goal: Identify Related Risk Factors and Signs and Symptoms  Outcome: Ongoing (interventions implemented as appropriate)  Flowsheets (Taken 5/17/2020 0224)  Related Risk Factors (Fall Risk): age-related changes; environment unfamiliar; other (see comments)  Signs and Symptoms (Fall Risk): presence of risk factors

## 2020-05-17 NOTE — ED PROVIDER NOTES
"Subjective   Mr. Garrett Mcwilliams is a 69 y.o male presenting to the emergency department with complaints of shortness of breath and syncope related to cough. His cardiologist is Dr. Rony Pizarro and he received a 4-vessel coronary artery bypass graft on 05/08/2020. The admission note from 05/07/2020 can be seen below. He was discharged on 05/12/2020 and he confirms he felt normal at the time. On Thursday, 2 days ago, he had 3 episodes of coughing fits that lead to a \"black out.\" His family is unaware whether these episodes were secondary to a seizure or syncope but he began \"jerking\" and his \"eyes rolled back.\" He went to the [facility] that same day where he was diagnosed with vasovagal syncope and sent home. He increased the air conditioning in his house yesterday and confirms this seemed to help until today when he had 4 addition episodes. He confirms if he is able to \"cough up the phlegm in time,\" he does not have a black out episode. He complains of shortness of breath with exertion and denies chest pain, fast and irregular palpitations, nausea, vomiting, fever, chills, and change in appetite. He has a history of chronic obstructive pulmonary disease, coronary artery disease, and myocardial infarction. There are no other acute symptoms at this time.      From hospitalist Dr. Macy Tran's note on 05/07/2020:  NSTEMI  MVCAD  -Pt underwent LHC on 5/6/20 with Dr. Perez at [facility].  Catheterization showed multiple lesions in the right coronary artery with at least 90% occlusion, bilateral occlusion posterior lateral arteries with 99% occlusion and 99% occlusion of the proximal circumflex and left anterior descending artery.  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  -Dr. Pizarro of [facility] aware of patient and will see in consult  -Currently on heparin gtt, pharmacy to dose  -Echo 5/6/2020 at OSH: EF 60-65%. No valvular abnormalities.   -Troponin at OSH " (3.470-->6.930-->11.7-->20.00)  -Troponin at our facility, 1.130, will trend q6 hours x 3 sets total. Will obtain EKG  -Continue ASA/BB/ACE/statin. Pt has listed allergy to ASA but states he was told it may hurt his ulcer. He has never had EGD to show he has an ulcer. He has never had a GIB. Therefore, will continue ASA.  -Pt will need COVID rule out prior to surgery, will order.  -Obtain Lipid panel, TSH, Hga1c in AM      History provided by:  Patient and relative  Shortness of Breath   Severity:  Moderate  Onset quality:  Sudden  Duration:  2 days  Timing:  Constant  Progression:  Worsening  Chronicity:  New  Relieved by:  Rest and lying down  Worsened by:  Exertion and movement  Associated symptoms: cough (mild sputum production from post-nasal drainage.) and sputum production    Associated symptoms: no abdominal pain, no chest pain, no fever and no vomiting    Risk factors: no recent alcohol use  Tobacco use: former.        Review of Systems   Constitutional: Negative for appetite change, chills and fever.   HENT: Positive for postnasal drip.    Respiratory: Positive for cough (mild sputum production from post-nasal drainage.), sputum production and shortness of breath.         History of COPD   Cardiovascular: Negative for chest pain and palpitations.   Gastrointestinal: Negative for abdominal pain, constipation, diarrhea, nausea and vomiting.   Genitourinary: Negative.    Musculoskeletal: Negative for back pain.   Neurological: Positive for syncope (Suspected cough syncope with 4 episodes today and 3 episodes 2 days ago.). Negative for dizziness, speech difficulty, weakness and light-headedness. Seizures: possible.   All other systems reviewed and are negative.      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        No Known Allergies    Past Surgical History:   Procedure Laterality Date   • CORONARY ARTERY BYPASS GRAFT N/A 2020    Procedure: MEDIAN STERNOTOMY, CORONARY ARTERY BYPASS GRAFT  X4 , ENDOSCOPIC VEIN HARVESTING OF LEFT GREATER SAPHENOUS VEIN , EVH EXPLORATION OF RIGH TLEG;  Surgeon: Rony Pizarro MD;  Location: [facility] OR;  Service: Cardiothoracic;  Laterality: N/A;  vo 1200  vr 1215   • PORTACATH PLACEMENT         Family History   Problem Relation Age of Onset   • Aneurysm Father        Social History     Socioeconomic History   • Marital status:      Spouse name: Not on file   • Number of children: Not on file   • Years of education: Not on file   • Highest education level: Not on file   Tobacco Use   • Smoking status: Former Smoker     Packs/day: 3.00     Years: 35.00     Pack years: 105.00     Types: Cigarettes     Last attempt to quit: 1990     Years since quittin.0   • Smokeless tobacco: Never Used   Substance and Sexual Activity   • Alcohol use: Never     Frequency: Never   • Drug use: Never         Objective   Physical Exam   Constitutional: He is oriented to person, place, and time. He appears well-developed and well-nourished. No distress. He is not intubated.   HENT:   Head: Normocephalic and atraumatic.   Eyes: Conjunctivae are normal. No scleral icterus.   Neck: Normal range of motion.   Cardiovascular: Normal rate, regular rhythm and normal heart sounds. Exam reveals no gallop and no friction rub.   No murmur heard.  No ectopy. No pedal edema to the lower extremities.   Pulmonary/Chest: Effort normal and breath sounds normal. No accessory muscle usage or stridor. No tachypnea. He is not intubated. No respiratory distress. He has no wheezes. He has no rhonchi. He has no rales.   Clear to auscultations. No tachypnea with conversation. He has a large vertical incision from recent coronary  artery bypass graft to chest wall. No dehiscence, redness, or warmth.   Abdominal: Soft. There is no tenderness. There is no rebound and no guarding.   Musculoskeletal: Normal range of motion. He exhibits no edema, tenderness or deformity.   Neurological: He is alert and oriented to person, place, and time.   Neurological examination is intact and nonfocal.   Skin: Skin is warm and dry. He is not diaphoretic. No erythema.   Psychiatric: He has a normal mood and affect. His behavior is normal.   Nursing note and vitals reviewed.      Procedures         ED Course  ED Course as of May 16 2241   Sat May 16, 2020   2054 EKG shows normal sinus rhythm.  Reviewed EKG with Dr. Fontenot, ER attending physician.  No evidence of acute ischemia.  CBC was stable.  Chemistries were essentially normal except for mild hyponatremia.  Sodium was 129.  3 days ago, sodium was 132.  BNP was 2325.  Troponin was 0.133.  9 days ago, troponin was 1.02.  Patient denies any chest pain at present.  Discussed the case in detail with Dr. Fontenot.  Due to cough syncope with recent four-vessel CABG, we will need to admit the patient for observation.  Orthostatic showed the following, lying blood pressure was 116/72 with heart rate 95, sitting blood pressure was 116/72 with heart rate of 96, standing blood pressure was 122/75 with heart rate of 101.  Patient did not have orthostatic hypotension.    [FC]   2107   COVID-19 RISK SCREEN    Has the patient had close contact without PPE with a lab confirmed COVID-19 (+) person or a person under investigation (PUI) for COVID-19 infection?  -- No     Has the patient had respiratory symptoms, worsened/new cough and/or SOA, unexplained fever, or sudden loss of smell and/or taste in the past 7 days? --  Yes    Does the patient have baseline higher exposure risk such as working in healthcare field or currently residing in healthcare facility?  --  No    [HV]   2128 Chest x-ray showed no acute findings.  Stable  left-sided volume loss and extensive left apical pulmonary scarring.  Right-sided Port-A-Cath noted.  I will page the hospitalist to discuss admission.  Vital signs are stable.  Patient is resting comfortably and has not had any further cough syncope.    [FC]      ED Course User Index  [FC] Rissa Nur PA-C  [HV] Lydia Brady     Recent Results (from the past 24 hour(s))   Comprehensive Metabolic Panel    Collection Time: 05/16/20  8:16 PM   Result Value Ref Range    Glucose 119 (H) 65 - 99 mg/dL    BUN 13 8 - 23 mg/dL    Creatinine 0.82 0.76 - 1.27 mg/dL    Sodium 129 (L) 136 - 145 mmol/L    Potassium 4.1 3.5 - 5.2 mmol/L    Chloride 92 (L) 98 - 107 mmol/L    CO2 23.0 22.0 - 29.0 mmol/L    Calcium 9.0 8.6 - 10.5 mg/dL    Total Protein 7.1 6.0 - 8.5 g/dL    Albumin 3.90 3.50 - 5.20 g/dL    ALT (SGPT) 41 1 - 41 U/L    AST (SGOT) 25 1 - 40 U/L    Alkaline Phosphatase 55 39 - 117 U/L    Total Bilirubin 0.6 0.2 - 1.2 mg/dL    eGFR Non African Amer 93 >60 mL/min/1.73    Globulin 3.2 gm/dL    A/G Ratio 1.2 g/dL    BUN/Creatinine Ratio 15.9 7.0 - 25.0    Anion Gap 14.0 5.0 - 15.0 mmol/L   BNP    Collection Time: 05/16/20  8:16 PM   Result Value Ref Range    proBNP 2,325.0 (H) 5.0 - 900.0 pg/mL   Troponin    Collection Time: 05/16/20  8:16 PM   Result Value Ref Range    Troponin T 0.133 (C) 0.000 - 0.030 ng/mL   Light Blue Top    Collection Time: 05/16/20  8:16 PM   Result Value Ref Range    Extra Tube hold for add-on    Green Top (Gel)    Collection Time: 05/16/20  8:16 PM   Result Value Ref Range    Extra Tube Hold for add-ons.    Lavender Top    Collection Time: 05/16/20  8:16 PM   Result Value Ref Range    Extra Tube hold for add-on    Gold Top - SST    Collection Time: 05/16/20  8:16 PM   Result Value Ref Range    Extra Tube Hold for add-ons.    CBC Auto Differential    Collection Time: 05/16/20  8:16 PM   Result Value Ref Range    WBC 11.47 (H) 3.40 - 10.80 10*3/mm3    RBC 3.63 (L) 4.14 - 5.80 10*6/mm3     Hemoglobin 10.8 (L) 13.0 - 17.7 g/dL    Hematocrit 33.4 (L) 37.5 - 51.0 %    MCV 92.0 79.0 - 97.0 fL    MCH 29.8 26.6 - 33.0 pg    MCHC 32.3 31.5 - 35.7 g/dL    RDW 15.1 12.3 - 15.4 %    RDW-SD 50.4 37.0 - 54.0 fl    MPV 8.7 6.0 - 12.0 fL    Platelets 427 140 - 450 10*3/mm3    Neutrophil % 78.2 (H) 42.7 - 76.0 %    Lymphocyte % 9.1 (L) 19.6 - 45.3 %    Monocyte % 9.5 5.0 - 12.0 %    Eosinophil % 2.3 0.3 - 6.2 %    Basophil % 0.3 0.0 - 1.5 %    Immature Grans % 0.6 (H) 0.0 - 0.5 %    Neutrophils, Absolute 8.98 (H) 1.70 - 7.00 10*3/mm3    Lymphocytes, Absolute 1.04 0.70 - 3.10 10*3/mm3    Monocytes, Absolute 1.09 (H) 0.10 - 0.90 10*3/mm3    Eosinophils, Absolute 0.26 0.00 - 0.40 10*3/mm3    Basophils, Absolute 0.03 0.00 - 0.20 10*3/mm3    Immature Grans, Absolute 0.07 (H) 0.00 - 0.05 10*3/mm3    nRBC 0.0 0.0 - 0.2 /100 WBC     Note: In addition to lab results from this visit, the labs listed above may include labs taken at another facility or during a different encounter within the last 24 hours. Please correlate lab times with ED admission and discharge times for further clarification of the services performed during this visit.    XR Chest 1 View   Final Result      1. No acute chest findings. Stable left-sided volume loss and extensive left apical pulmonary scarring.   2. Right-sided Port-A-Cath again noted with catheter extending cephalad in the right internal jugular vein.      Signer Name: Leandro Messina MD    Signed: 5/16/2020 8:57 PM    Workstation Name: [id number]     [facility]        Vitals:    05/16/20 2100 05/16/20 2130 05/16/20 2200 05/16/20 2230   BP: 112/70 106/70 100/70 116/67   BP Location:       Patient Position:       Pulse: 94 95 94 92   Resp:       Temp:       TempSrc:       SpO2: 98% 98% 98% 98%   Weight:       Height:         Medications   sodium chloride 0.9 % flush 10 mL (has no administration in time range)   ipratropium-albuterol (DUO-NEB) nebulizer solution 3 mL  (has no administration in time range)     ECG/EMG Results (last 24 hours)     Procedure Component Value Units Date/Time    ECG 12 Lead [[id number]] Collected:  05/16/20 2007     Updated:  05/16/20 2009        ECG 12 Lead         ECG 12 Lead                     MDM    Final diagnoses:   Cough syncope   Shortness of breath   History of coronary artery disease   History of four vessel coronary artery bypass graft   History of COPD       Documentation assistance provided by nannette Brady.  Information recorded by the scribe was done at my direction and has been verified and validated by me.     Lydia Brady  05/16/20 0867       Rissa Nur PA-C  05/16/20 3583

## 2020-05-17 NOTE — PROGRESS NOTES
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 Rony Pizarro on 5/8/2020 and was discharged home on 5/13/2020 with an uneventful postoperative course.  At home, the patient began experiencing episodes of coughing which resulted in seizure-like activity and loss of consciousness which was initially thought to be a posttussive vasovagal response.  This occurred on multiple occasions and the patient was subsequently evaluated in the emergency department and admitted to [facility].      Objective    Physical Exam:   Vital Signs   Temp:  [97.8 °F (36.6 °C)-98.3 °F (36.8 °C)] 97.9 °F (36.6 °C)  Heart Rate:  [] 92  Resp:  [18-20] 18  BP: (100-133)/(64-85) 106/64   GEN: NAD   RESP: Clear to auscultation bilaterally no wheezes, rales or rhonchi, respirations even and unlabored    CV: Regular rate and rhythm no murmurs, rubs or gallops.  Sternum stable.   ABD: Soft, nontender/nondistended with normoactive bowel sounds    EXT: Warm good color perfusion no bilateral lower extremity edema   INT: Incision c/d/i   Neuro: Alert and oriented x3 with cranial nerves II through XII gross intact no motor or sensory deficits    Intake/Output Summary (Last 24 hours) at 5/17/2020 1155  Last data filed at 5/17/2020 1100  Gross per 24 hour   Intake --   Output 1450 ml   Net -1450 ml     Results     Results from last 7 days   Lab Units 05/17/20  0644   WBC 10*3/mm3 10.07   HEMOGLOBIN g/dL 10.8*   HEMATOCRIT % 36.9*   PLATELETS 10*3/mm3 357     Results from last 7 days   Lab Units 05/17/20  0644   SODIUM mmol/L 132*   POTASSIUM mmol/L 4.7   CHLORIDE mmol/L 99   CO2 mmol/L 20.0*   BUN mg/dL 11   CREATININE mg/dL 0.69*   GLUCOSE mg/dL 124*   CALCIUM mg/dL 8.9              Assessment/Plan       Shortness of breath    Coronary artery disease involving native coronary artery of native heart status post CABG x4 5/8/2020    Squamous cell carcinoma lung, left status post XRT    Stage IV COPD, with FEV1 of 38%    Hyponatremia    Anemia    Leukocytosis    Recurrent syncope        Plan   Continue with work-up of these syncopal episodes with possible neurology consult.  Doing well from a surgical perspective.    MARIVEL Golden  05/17/20  11:55

## 2020-05-17 NOTE — OUTREACH NOTE
CT Surgery Week 2 Survey      Responses   [facility] patient discharged from?  [place]   Does the patient have one of the following disease processes/diagnoses(primary or secondary)?  Cardiothoracic surgery   Week 2 attempt successful?  No   Revoke  Readmitted          Melodie Stoner RN

## 2020-05-17 NOTE — CONSULTS
[facility] at [facility]   Consult     Garrett Mcwilliams  1950    There is no work phone number on file.      05/17/20    DATE OF ADMISSION: 5/16/2020  [facility] 2F    Vaishali Massey, APRN  [address] / [place] [address]    Chief Complaint/Reason for Consultation: Syncope, recent CABG    Patient Active Problem List   Diagnosis   • Coronary artery disease involving native coronary artery of native heart status post CABG x4 5/8/2020   • Squamous cell carcinoma lung, left status post XRT   • Squamous cell carcinoma of skin of cheek   • Osteoarthritis   • Stage IV COPD, with FEV1 of 38%   • Remote tobacco use   • Shortness of breath   • Hyponatremia   • Anemia   • Leukocytosis   • Recurrent syncope       History of Present Illness:   Patient is a 69-year-old  male with a past medical history significant for CAD status post recent four-vessel CABG with Dr. Pizarro on 5/8/2020, squamous cell carcinoma of his left lung status post radiation, COPD, remote tobacco abuse, who presented to the [facility] ED overnight secondary to episodes of syncope.  He reports since discharge from the hospital a few days ago after his CABG, he has had 4 episodes where he gets into a coughing fit and then wakes up with his wife yelling his name.  He is unable to provide significant details around the events as he is unaware that he has passed out and denies any specific symptoms prior to the events including palpitations, chest pain, shortness of breath, diaphoresis, or nausea/vomiting.  I did speak to the wife on the phone who reports that this has happened in the past when he was diagnosed with lung cancer with the last episode occurring approximately a year ago.  She reports all of the episodes have always occurred with a coughing spell.  She states that his upper body will shake and he will eventually come to after approximately 30 seconds.  He is oriented upon awakening.  No loss of bowel or  bladder control.  She does report that his blood pressure normally runs on the lower side with readings in the high 90s to low 100s.  She reports heart rates have been averaging 70s to 90s at home.  Of note, he was seen down in [place] for these episodes and was diagnosed with vasovagal syncope.  They presented here as they wished to have a second opinion.    No Known Allergies    Prior to Admission Medications     Prescriptions Last Dose Informant Patient Reported? Taking?    aspirin 81 MG EC tablet   No No    Take 1 tablet by mouth Daily.    atorvastatin (LIPITOR) 80 MG tablet   No No    Take 1 tablet by mouth Every Night.    clopidogrel (PLAVIX) 75 MG tablet   No No    Take 1 tablet by mouth Daily.    HYDROcodone-acetaminophen (NORCO) 7.5-325 MG per tablet   No No    Take 1 tablet by mouth Every 6 (Six) Hours As Needed for Moderate Pain  for up to 5 days.    latanoprost (XALATAN) 0.005 % ophthalmic solution  Pharmacy Yes No    Administer 1 drop to both eyes Every Night.    Loratadine (CLARITIN PO)  Self Yes No    Take 10 mg by mouth Daily As Needed (allergies).    metoprolol tartrate (LOPRESSOR) 25 MG tablet   No No    Take 0.5 tablets by mouth Every 12 (Twelve) Hours.            Current Facility-Administered Medications:   •  acetaminophen (TYLENOL) tablet 650 mg, 650 mg, Oral, Q4H PRN **OR** acetaminophen (TYLENOL) 160 MG/5ML solution 650 mg, 650 mg, Oral, Q4H PRN **OR** acetaminophen (TYLENOL) suppository 650 mg, 650 mg, Rectal, Q4H PRN, Sofi Macias PA-C  •  aspirin EC tablet 81 mg, 81 mg, Oral, Daily, Sofi Macias PA-C, 81 mg at 05/17/20 0903  •  atorvastatin (LIPITOR) tablet 80 mg, 80 mg, Oral, Nightly, Sofi Macias PA-C, 80 mg at 05/17/20 0042  •  clopidogrel (PLAVIX) tablet 75 mg, 75 mg, Oral, Daily, Sofi Macias PA-C, 75 mg at 05/17/20 0903  •  guaiFENesin (MUCINEX) 12 hr tablet 600 mg, 600 mg, Oral, Q12H, Starla Humphreys MD, 600 mg at 05/17/20 0903  •  heparin (porcine) 5000 UNIT/ML  injection 5,000 Units, 5,000 Units, Subcutaneous, Q12H, Starla Humphreys MD, 5,000 Units at 20 0903  •  ipratropium-albuterol (DUO-NEB) nebulizer solution 3 mL, 3 mL, Nebulization, 4x Daily - RT, Sofi Macias PA-C, 3 mL at 20 2244  •  ipratropium-albuterol (DUO-NEB) nebulizer solution 3 mL, 3 mL, Nebulization, Q4H PRN, Sofi Macias, PA-C  •  latanoprost (XALATAN) 0.005 % ophthalmic solution 1 drop, 1 drop, Both Eyes, Nightly, Sofi Macias PA-C, 1 drop at 20 0107  •  melatonin tablet 5 mg, 5 mg, Oral, Nightly PRN, Sofi Macias, PA-C  •  sodium chloride 0.9 % flush 10 mL, 10 mL, Intravenous, PRN, Alcides Maciasae, PA-C  •  sodium chloride 0.9 % flush 10 mL, 10 mL, Intravenous, Q12H, Sofi Macias PA-C, 10 mL at 20 0904  •  sodium chloride 0.9 % flush 10 mL, 10 mL, Intravenous, PRN, Alcides Maciasae, PA-C    Social History     Socioeconomic History   • Marital status:      Spouse name: Not on file   • Number of children: Not on file   • Years of education: Not on file   • Highest education level: Not on file   Tobacco Use   • Smoking status: Former Smoker     Packs/day: 3.00     Years: 35.00     Pack years: 105.00     Types: Cigarettes     Last attempt to quit: 1990     Years since quittin.0   • Smokeless tobacco: Never Used   Substance and Sexual Activity   • Alcohol use: Never     Frequency: Never   • Drug use: Never       Family History   Problem Relation Age of Onset   • Aneurysm Father        REVIEW OF SYSTEMS:   CONST:  No weight loss, fever, chills, weakness or fatigue.   HEENT:  No visual loss, blurred vision, double vision, yellow sclerae.                   No hearing loss, congestion, sore throat.   SKIN:      No rashes, urticaria, ulcers, sores.     RESP:     + shortness of breath, no hemoptysis, + cough, + sputum.   GI:           No anorexia, nausea, vomiting, diarrhea. No abdominal pain, melena.   :         No burning on urination, hematuria or  increased frequency.  ENDO:    No diaphoresis, cold or heat intolerance. No polyuria or polydipsia.   NEURO:  No headache, dizziness, + syncope, no paralysis, ataxia, or parasthesias.                  No change in bowel or bladder control. No history of CVA/TIA  MUSC:    No muscle, back pain, joint pain or stiffness.   HEME:    No anemia, bleeding, bruising. No history of DVT/PE.  PSYCH:  No history of depression, anxiety    OBJECTIVE:    Vitals:    05/17/20 0556 05/17/20 0557 05/17/20 0600 05/17/20 0737   BP: 109/73 108/67  106/64   BP Location: Left arm Left arm  Left arm   Patient Position: Sitting Standing  Lying   Pulse:   103 92   Resp:    18   Temp:    97.9 °F (36.6 °C)   TempSrc:    Oral   SpO2:   97% 99%   Weight:       Height:             Vital Sign Min/Max for last 24 hours  Temp  Min: 97.8 °F (36.6 °C)  Max: 98.3 °F (36.8 °C)   BP  Min: 100/70  Max: 133/81   Pulse  Min: 90  Max: 154   Resp  Min: 18  Max: 20   SpO2  Min: 96 %  Max: 100 %   No data recorded      Intake/Output Summary (Last 24 hours) at 5/17/2020 0906  Last data filed at 5/17/2020 0408  Gross per 24 hour   Intake --   Output 800 ml   Net -800 ml             Physical Exam:  GEN: Well nourished, well-developed, no acute distress  HEENT: Normocephalic, atraumatic, PERRLA, moist mucous membranes  NECK: Supple, No JVD, no thyromegaly, no lymphadenopathy  CARD: S1S2, RRR, no murmur, gallop, or rub  LUNGS: + expiratory wheezes, normal respiratory effort  ABDOMEN: Soft, nontender, normal bowel sounds  EXTREMITIES: No gross deformities, no clubbing, cyanosis, or edema  SKIN: Warm, dry, midline sternal incision healing well  NEURO: No focal deficits, alert and oriented x 3  PSYCHIATRIC: Normal affect and mood      Data:   Results from last 7 days   Lab Units 05/17/20  0644 05/16/20 2016 05/13/20  0412   WBC 10*3/mm3 10.07 11.47* 9.30   HEMOGLOBIN g/dL 10.8* 10.8* 10.1*   HEMATOCRIT % 36.9* 33.4* 31.9*   PLATELETS 10*3/mm3 357 427 286     Results  from last 7 days   Lab Units 05/17/20  0644 05/16/20 2016 05/13/20  0412   SODIUM mmol/L 132* 129* 132*   POTASSIUM mmol/L 4.7 4.1 4.3   CHLORIDE mmol/L 99 92* 94*   CO2 mmol/L 20.0* 23.0 28.0   BUN mg/dL 11 13 16   CREATININE mg/dL 0.69* 0.82 0.72*   GLUCOSE mg/dL 124* 119* 121*              Results from last 7 days   Lab Units 05/16/20 2016   PROBNP pg/mL 2,325.0*         Results from last 7 days   Lab Units 05/17/20  0644 05/16/20 2212 05/16/20 2016   TROPONIN T ng/mL 0.076* 0.115* 0.133*         Results from last 7 days   Lab Units 05/16/20 2016   PROBNP pg/mL 2,325.0*       Intake/Output Summary (Last 24 hours) at 5/17/2020 0906  Last data filed at 5/17/2020 0408  Gross per 24 hour   Intake --   Output 800 ml   Net -800 ml       Chest X-Ray:  Imaging Results (Last 24 Hours)     Procedure Component Value Units Date/Time    CT Angiogram Chest With & Without Contrast [[id number]] Collected:  05/16/20 2356     Updated:  05/16/20 2358    Narrative:       CT ANGIOGRAM CHEST W WO CONTRAST    INDICATION:   History of left-sided lung cancer. Coronary artery disease. Shortness of air and cough today. Syncope.    TECHNIQUE:   CT angiogram of the chest with IV contrast. 3-D reconstructions were obtained and reviewed.   Radiation dose reduction techniques included automated exposure control or exposure modulation based on body size. Count of known CT and cardiac nuc med studies  performed in previous 12 months: 0.     COMPARISON:   None available.    FINDINGS:   No pulmonary embolism or aortic dissection is identified. There is atherosclerotic disease and coronary artery disease. Patient is status post CABG. There is a small pericardial effusion. There is a trace amount of left pleural fluid. There is a trace  amount of right pleural fluid with adjacent atelectasis in the right lower lobe. There is COPD. Right lung is otherwise clear. Volume loss is noted in the left hemithorax with extensive fibrosis in the left upper  lung, presumably related to prior  radiation therapy. Correlate with history. Bandlike areas of scarring or atelectasis are present at the left lung base. No CT findings present to indicate pneumonia. Note: CT may be negative in the earliest stages of COVID-19. No pneumothorax is  identified. There are multiple gas bubbles in the epicardial fat near the cardiac apex on the left. This is likely related to recent CABG. No suspicious osseous lesions are identified. The tip of the patient's right-sided Port-A-Cath appears to terminate  in the right internal jugular vein. Upper abdomen shows colonic diverticulosis.      Impression:         1. No PE or aortic dissection.  2. Chronic fibrosis and volume loss in the left upper lung is presumably related to radiation therapy.  3. Recent CABG with a small pericardial effusion. There are also trace bilateral pleural effusions with some atelectasis in the lower lobes.  4. COPD. No CT findings present to indicate pneumonia. Note: CT may be negative in the earliest stages of COVID-19.    Signer Name: Rony Nicole MD   Signed: 5/16/2020 11:56 PM   Workstation Name: [id number]    [facility]    XR Chest 1 View [[id number]] Collected:  05/16/20 2057     Updated:  05/16/20 2059    Narrative:       CR Chest 1 Vw    INDICATION:   69-year-old male with shortness of air and cough today. Multiple syncopal episodes today.     COMPARISON:    Chest 5/13/2020    FINDINGS:  Single portable AP view(s) of the chest.  Chronic left-sided volume loss and extensive left apical pulmonary scarring. Right lung clear. No pneumothorax area no large pleural effusions. Heart size and mediastinum appear stable. Median sternotomy wires.  Right-sided Port-A-Cath again noted with catheter extending cephalad in the right internal jugular vein.      Impression:         1. No acute chest findings. Stable left-sided volume loss and extensive left apical pulmonary scarring.  2. Right-sided  Port-A-Cath again noted with catheter extending cephalad in the right internal jugular vein.    Signer Name: Leandro Messina MD   Signed: 5/16/2020 8:57 PM   Workstation Name: [email or web address]    [facility]          Telemetry: NSR, HR  bpm  EKG: pending today.  EKG from yesterday demonstrating NSR, HR 96 bpm    Assessment and Plan:   1. Syncope:  - Agree that this is likely post tussive syncope as every episode has occurred after a significant coughing spell  - Normal EF  - Carotid duplex done on previous admission without significant stenosis  - Would hold metoprolol at this time given borderline low BP's in setting of vasovagal syncope    2. Coronary artery disease:  - S/p CABG x 4, Dr. Pizarro, 5/8/2020.  Normal EF  - No recent anginal complaints  - Continue ASA, plavix, statin  - BBL on hold secondary to #1    3. Tachycardia:  - Patient had short episode of tachycardia last night in the 150's.  Appears to possibly be atrial fibrillation per telemetry strip.  No prior history.  Monitor for now.  Nurse will attempt to get 12-lead EKG if any recurrence.  - Of note, had no atrial fibrillation post-operatively.    4. Lung cancer/COPD/cough:  - Per primary team    Electronically signed by OWEN Brewer, 05/17/20, 9:06 AM.      History and physical examination verified.  Agree with nurse practitioner's note.    69-year-old white male with a history of coronary artery disease status post CABG x4 vessels on May 8/2020, squamous cell lung carcinoma status post XRT, squamous cell carcinoma of the skin, DJD, stage IV COPD, hyponatremia, and anemia who was admitted secondary to syncope.  Were asked to see the patient and manage his syncopal episodes.   Since being discharged from his recent surgery, he has had a total of 4 episodes where he gets into a coughing fit that subsequently resulted in a syncopal episode.  He apparently has had syncopal episodes induced by cough in the  past when he was initially diagnosed and treated for his lung cancer.  His last episode prior to these episodes of appeared over a year ago.  Generally according to his wife, the episodes start with a coughing spell and subsequently his upper body will shake and then he has a syncopal episode.  Approximately 30 seconds later, he awakes and is oriented to the place around him.  He has had no loss of urine or stool.  His blood pressures normally runs low with systolic 90 to 100 mmHg.  Heart rates have been stable averaging about 80 bpm.  The patient does have chronic shortness of breath with expiratory wheezes and dyspnea on exertion due to his underlying COPD.    Review of systems/family history/social history as per nurse practitioner's note.    Physical Exam 114/70 pulse is 92 respiratory rate is 22 O2 saturation 96% on room air.  He is afebrile.  Constitutional: oriented to person, place, and time.  well-developed and well-nourished. No distress.   HENT: Normocephalic.   Eyes: Conjunctivae are normal. No scleral icterus.   Neck: Normal carotid pulses, no hepatojugular reflux and no JVD present. Carotid bruit is not present. No tracheal deviation, no edema and no erythema present. No thyromegaly present.   Cardiovascular: Regular rate rhythm normal S1-S2 there is an S4 no new murmurs PMI appears to be normal.  Pulmonary/Chest: Bilateral wheezes at both bases expiratory nature.  Respiratory effort fair.  Abdominal: Soft. Bowel sounds are normal. no distension and no mass. There is no hepatosplenomegaly. There is no tenderness. There is no rebound and no guarding. No aortic pulsations.  Musculoskeletal:  exhibits no edema, tenderness or deformity.   Neurological: is alert and oriented to person, place, and time.  Rest is nonfocal.    Skin: Skin is warm and dry. No rash noted. No diaphoretic. No cyanosis or erythema. No pallor. Nails show no clubbing.   Psychiatric: Normal mood and affect.Speech is normal and behavior  is normal.    Laboratory data as per nurse practitioner's note.  White count is 10,000, creatinine 0.69, BNP is 2300    CT scan of the chest results noted.  Consistent with fibrosis.  No PE noted.  Bilateral pleural effusions with atelectasis in the lower lobes    Twelve-lead EKG demonstrates normal sinus rhythm heart rate 92 bpm normal WI, QRS and QT intervals.  Poor R wave progression is noted.  Normal WI, QRS and QT intervals.    Echocardiogram on 5/8/2020 LVEF 60% no significant valvular insufficiency    Impression/plan:    1. Syncope secondary to cough; cough worsening most likely secondary to recent cardio bypass and graft surgery resulting in atelectasis and pleural effusions in a patient with underlying COPD and previous lung cancer.  Need to suppress his cough and hopefully his syncopal episodes were improved.    2.  CAD status post CABG x4 5/8/2020 normal LVEF.  Overall doing reasonably well with no anginal type symptoms.  On appropriate medications.  Would discontinue beta-blockers completely at this time.    3.  Questionable paroxysmal atrial fibrillation episode yesterday.  Will monitor for now.    4.  Lung cancer/COPD; per hospitalist.      I, Tristen Vail MD, personally performed the services face to face as described and documented by the above named individual. I have made any necessary edits and it is both accurate and complete 5/17/2020  12:15

## 2020-05-17 NOTE — PROGRESS NOTES
[facility] Medicine Services  PROGRESS NOTE    Patient Name: Garrett Mcwilliams  : 1950  MRN: [id number]    Date of Admission: 2020  Primary Care Physician: Vaishali Massey APRN    Subjective   Subjective     CC:  Presyncopal episodes.    HPI:  Resting in bed in no acute distress and overall comfortable.  No fever or chills.  No chest pain, palpitation, shortness of breath at rest.  Has his chronic cough but no headache.  No nausea, vomiting, diarrhea, abdominal pain.    Review of Systems      Objective   Objective     Vital Signs:   Temp:  [97.8 °F (36.6 °C)-98.3 °F (36.8 °C)] 97.9 °F (36.6 °C)  Heart Rate:  [] 93  Resp:  [18-20] 18  BP: (100-133)/(64-86) 114/86        Physical Exam:  Constitutional: No acute distress  HENT: NCAT, mucous membranes moist  Respiratory: Expiratory wheezing, diminished breath sounds, respiratory effort normal   Cardiovascular: RRR, no murmurs, rubs, or gallops  Gastrointestinal: Positive bowel sounds, soft, nontender, nondistended  Musculoskeletal: No bilateral ankle edema  Psychiatric: Appropriate affect, cooperative  Neurologic: Awake, alert, follows commands.  No focality.  Speech clear  Skin: No rashes    Results Reviewed:  Results from last 7 days   Lab Units 20  0412   WBC 10*3/mm3 10.07  --  11.47* 9.30   HEMOGLOBIN g/dL 10.8*  --  10.8* 10.1*   HEMATOCRIT % 36.9*  --  33.4* 31.9*   PLATELETS 10*3/mm3 357  --  427 286   PROCALCITONIN ng/mL  --  0.12  --   --      Results from last 7 days   Lab Units 20  041   SODIUM mmol/L 132*  --  129* 132*   POTASSIUM mmol/L 4.7  --  4.1 4.3   CHLORIDE mmol/L 99  --  92* 94*   CO2 mmol/L 20.0*  --  23.0 28.0   BUN mg/dL 11  --  13 16   CREATININE mg/dL 0.69*  --  0.82 0.72*   GLUCOSE mg/dL 124*  --  119* 121*   CALCIUM mg/dL 8.9  --  9.0 9.0   ALT (SGPT) U/L  --   --  41  --    AST (SGOT) U/L  --   --   25  --    TROPONIN T ng/mL 0.076* 0.115* 0.133*  --    PROBNP pg/mL  --   --  2,325.0*  --      Estimated Creatinine Clearance: 85.9 mL/min (A) (by C-G formula based on SCr of 0.69 mg/dL (L)).    Microbiology Results Abnormal     Procedure Component Value - Date/Time    Respiratory Culture - Sputum, Cough [[id number]] Collected:  05/17/20 0615    Lab Status:  Preliminary result Specimen:  Sputum from Cough Updated:  05/17/20 0934     Gram Stain Rare (1+) Epithelial cells per low power field      Rare (1+) WBCs per low power field      Few (2+) Mixed bacterial morphotypes seen on Gram Stain    Eosinophil Smear - Urine, Urine, Clean Catch [[id number]]  (Normal) Collected:  05/17/20 0111    Lab Status:  Final result Specimen:  Urine, Clean Catch Updated:  05/17/20 0451     Eosinophil Smear 0 % EOS/100 Cells     Narrative:       No eosinophil seen    Respiratory Panel, PCR - Swab, Nasopharynx [[id number]]  (Normal) Collected:  05/17/20 0117    Lab Status:  Final result Specimen:  Swab from Nasopharynx Updated:  05/17/20 0403     ADENOVIRUS, PCR Not Detected     Coronavirus 229E Not Detected     Coronavirus HKU1 Not Detected     Coronavirus NL63 Not Detected     Coronavirus OC43 Not Detected     Human Metapneumovirus Not Detected     Human Rhinovirus/Enterovirus Not Detected     Influenza B PCR Not Detected     Parainfluenza Virus 1 Not Detected     Parainfluenza Virus 2 Not Detected     Parainfluenza Virus 3 Not Detected     Parainfluenza Virus 4 Not Detected     Bordetella pertussis pcr Not Detected     Influenza A H1 2009 PCR Not Detected     Chlamydophila pneumoniae PCR Not Detected     Mycoplasma pneumo by PCR Not Detected     Influenza A PCR Not Detected     Influenza A H3 Not Detected     Influenza A H1 Not Detected     RSV, PCR Not Detected     Bordetella parapertussis PCR Not Detected    Narrative:       The coronavirus on the RVP is NOT COVID-19 and is NOT indicative of infection with COVID-19.           Imaging  Results (Last 24 Hours)     Procedure Component Value Units Date/Time    CT Angiogram Chest With & Without Contrast [[id number]] Collected:  05/16/20 2356     Updated:  05/16/20 2358    Narrative:       CT ANGIOGRAM CHEST W WO CONTRAST    INDICATION:   History of left-sided lung cancer. Coronary artery disease. Shortness of air and cough today. Syncope.    TECHNIQUE:   CT angiogram of the chest with IV contrast. 3-D reconstructions were obtained and reviewed.   Radiation dose reduction techniques included automated exposure control or exposure modulation based on body size. Count of known CT and cardiac nuc med studies  performed in previous 12 months: 0.     COMPARISON:   None available.    FINDINGS:   No pulmonary embolism or aortic dissection is identified. There is atherosclerotic disease and coronary artery disease. Patient is status post CABG. There is a small pericardial effusion. There is a trace amount of left pleural fluid. There is a trace  amount of right pleural fluid with adjacent atelectasis in the right lower lobe. There is COPD. Right lung is otherwise clear. Volume loss is noted in the left hemithorax with extensive fibrosis in the left upper lung, presumably related to prior  radiation therapy. Correlate with history. Bandlike areas of scarring or atelectasis are present at the left lung base. No CT findings present to indicate pneumonia. Note: CT may be negative in the earliest stages of COVID-19. No pneumothorax is  identified. There are multiple gas bubbles in the epicardial fat near the cardiac apex on the left. This is likely related to recent CABG. No suspicious osseous lesions are identified. The tip of the patient's right-sided Port-A-Cath appears to terminate  in the right internal jugular vein. Upper abdomen shows colonic diverticulosis.      Impression:         1. No PE or aortic dissection.  2. Chronic fibrosis and volume loss in the left upper lung is presumably related to radiation  therapy.  3. Recent CABG with a small pericardial effusion. There are also trace bilateral pleural effusions with some atelectasis in the lower lobes.  4. COPD. No CT findings present to indicate pneumonia. Note: CT may be negative in the earliest stages of COVID-19.    Signer Name: Rony Nicole MD   Signed: 5/16/2020 11:56 PM   Workstation Name: [id number]    [facility]    XR Chest 1 View [[id number]] Collected:  05/16/20 2057     Updated:  05/16/20 2059    Narrative:       CR Chest 1 Vw    INDICATION:   69-year-old male with shortness of air and cough today. Multiple syncopal episodes today.     COMPARISON:    Chest 5/13/2020    FINDINGS:  Single portable AP view(s) of the chest.  Chronic left-sided volume loss and extensive left apical pulmonary scarring. Right lung clear. No pneumothorax area no large pleural effusions. Heart size and mediastinum appear stable. Median sternotomy wires.  Right-sided Port-A-Cath again noted with catheter extending cephalad in the right internal jugular vein.      Impression:         1. No acute chest findings. Stable left-sided volume loss and extensive left apical pulmonary scarring.  2. Right-sided Port-A-Cath again noted with catheter extending cephalad in the right internal jugular vein.    Signer Name: Leandro Messina MD   Signed: 5/16/2020 8:57 PM   Workstation Name: [email or web address]    [facility]          Results for orders placed during the hospital encounter of 05/07/20   Adult Transthoracic Echocardiogram Complete    Narrative · The following left ventricular wall segments are hypokinetic: apical   anterior and apex hypokinetic.  · Estimated EF = 60%.  · Left ventricular systolic function is normal.  · Left ventricular diastolic dysfunction (grade I) consistent with   impaired relaxation.  · Mild tricuspid valve regurgitation is present.  · There is no evidence of a left ventricular mass or thrombus present.  · Normal right  ventricular cavity size, wall thickness, systolic function   and septal motion noted.  · The aortic valve exhibits mild sclerosis.  · No evidence of pulmonary hypertension is present.  · There is no evidence of pericardial effusion.          I have reviewed the medications:  Scheduled Meds:  aspirin 81 mg Oral Daily   atorvastatin 80 mg Oral Nightly   clopidogrel 75 mg Oral Daily   guaiFENesin 600 mg Oral Q12H   heparin (porcine) 5,000 Units Subcutaneous Q12H   ipratropium-albuterol 3 mL Nebulization Q4H While Awake - RT   latanoprost 1 drop Both Eyes Nightly   sodium chloride 10 mL Intravenous Q12H     Continuous Infusions:   PRN Meds:.•  acetaminophen **OR** acetaminophen **OR** acetaminophen  •  melatonin  •  sodium chloride  •  sodium chloride    Assessment/Plan   Assessment & Plan     Active Hospital Problems    Diagnosis  POA   • **Shortness of breath [R06.02]  Yes   • Hyponatremia [E87.1]  Yes   • Anemia [D64.9]  Yes   • Leukocytosis [D72.829]  Yes   • Recurrent syncope [R55]  Yes   • Squamous cell carcinoma lung, left status post XRT [C34.92]  Yes   • Stage IV COPD, with FEV1 of 38% [J43.9]  Yes   • Coronary artery disease involving native coronary artery of native heart status post CABG x4 5/8/2020 [I25.10]  Yes      Resolved Hospital Problems   No resolved problems to display.        Brief Hospital Course to date:  Garrett Mcwilliams is a 69 y.o. male with past medical history significant for coronary artery disease status post CABG recently, stage IV COPD, stage III squamous cell lung cancer.  Patient was recently hospitalized here at [facility] and had CABG done on 5/8/2020.  After discharge evidently patient has had multiple episodes of presyncope.  These episodes happen even when patient is lying down but most of the time if not all the time it happens after a coughing spell.  Patient has gone to local emergency room for that.  Now patient comes back because these episodes continue.  Cardiology  has seen and evaluated the patient.  In their assessment these episodes or post to severe presyncopal episodes.  Patient also had tachycardia on presentation.  While in the hospital also with evidence of atrial fibrillation which is new.    *Presyncopal episodes, most likely posttussive vasovagal.  Also, seems that patient has low blood pressure during every episode.  Cardiology is on board.    *OPD and lung cancer.  Patient has his chronic cough but respiratory wise seems to be close to baseline.    *Tachycardia with new A. fib.  Cardiology is following.    *Coronary artery disease status post CABG on 5/8/2020.    *Mild hyponatremia.     PLAN:  - continue current care  -Beta-blockers and management of atrial fibrillation and tachycardia per cardiology.  - labs in am, monitor na+      DVT Prophylaxis:     Daily Care Communication  Due to current limited visitation policies, an attempt will be made daily to update patient's identified best point-of-contact(s)   Contact: Kristy Mcwilliams   Relation: wife   Type of communication ([phone] ):   Time of communication: 2;45 PM   Notes (if applicable): updated her and answered all of her questions     Disposition: I expect the patient to be discharged  When ok with cardiology..    CODE STATUS:   Code Status and Medical Interventions:   Ordered at: 05/16/20 7164     Level Of Support Discussed With:    Patient     Code Status:    CPR     Medical Interventions (Level of Support Prior to Arrest):    Full         Electronically signed by Quincy Lopez MD, 05/17/20, 14:33.

## 2020-05-18 ENCOUNTER — READMISSION MANAGEMENT (OUTPATIENT)
Dept: CALL CENTER | Facility: HOSPITAL | Age: 70
End: 2020-05-18

## 2020-05-18 VITALS
BODY MASS INDEX: 25.96 KG/M2 | HEIGHT: 65 IN | TEMPERATURE: 97.9 F | WEIGHT: 155.8 LBS | HEART RATE: 93 BPM | RESPIRATION RATE: 18 BRPM | DIASTOLIC BLOOD PRESSURE: 71 MMHG | SYSTOLIC BLOOD PRESSURE: 109 MMHG | OXYGEN SATURATION: 98 %

## 2020-05-18 PROBLEM — R06.02 SHORTNESS OF BREATH: Status: RESOLVED | Noted: 2020-05-16 | Resolved: 2020-05-18

## 2020-05-18 PROBLEM — R55 RECURRENT SYNCOPE: Status: RESOLVED | Noted: 2020-05-16 | Resolved: 2020-05-18

## 2020-05-18 PROCEDURE — 96372 THER/PROPH/DIAG INJ SC/IM: CPT

## 2020-05-18 PROCEDURE — 25010000002 FUROSEMIDE PER 20 MG: Performed by: PHYSICIAN ASSISTANT

## 2020-05-18 PROCEDURE — 93010 ELECTROCARDIOGRAM REPORT: CPT | Performed by: INTERNAL MEDICINE

## 2020-05-18 PROCEDURE — 99024 POSTOP FOLLOW-UP VISIT: CPT | Performed by: THORACIC SURGERY (CARDIOTHORACIC VASCULAR SURGERY)

## 2020-05-18 PROCEDURE — 25010000002 HEPARIN (PORCINE) PER 1000 UNITS: Performed by: INTERNAL MEDICINE

## 2020-05-18 PROCEDURE — 99217 PR OBSERVATION CARE DISCHARGE MANAGEMENT: CPT | Performed by: HOSPITALIST

## 2020-05-18 PROCEDURE — G0378 HOSPITAL OBSERVATION PER HR: HCPCS

## 2020-05-18 PROCEDURE — 93005 ELECTROCARDIOGRAM TRACING: CPT | Performed by: PHYSICIAN ASSISTANT

## 2020-05-18 PROCEDURE — 94799 UNLISTED PULMONARY SVC/PX: CPT

## 2020-05-18 PROCEDURE — 96375 TX/PRO/DX INJ NEW DRUG ADDON: CPT

## 2020-05-18 RX ORDER — FUROSEMIDE 10 MG/ML
60 INJECTION INTRAMUSCULAR; INTRAVENOUS ONCE
Status: COMPLETED | OUTPATIENT
Start: 2020-05-18 | End: 2020-05-18

## 2020-05-18 RX ORDER — ALBUTEROL SULFATE 90 UG/1
2 AEROSOL, METERED RESPIRATORY (INHALATION) EVERY 4 HOURS PRN
Qty: 18 G | Refills: 0 | Status: SHIPPED | OUTPATIENT
Start: 2020-05-18

## 2020-05-18 RX ORDER — GUAIFENESIN 600 MG/1
600 TABLET, EXTENDED RELEASE ORAL 2 TIMES DAILY PRN
Start: 2020-05-18

## 2020-05-18 RX ADMIN — GUAIFENESIN 600 MG: 600 TABLET, EXTENDED RELEASE ORAL at 08:22

## 2020-05-18 RX ADMIN — IPRATROPIUM BROMIDE AND ALBUTEROL SULFATE 3 ML: 2.5; .5 SOLUTION RESPIRATORY (INHALATION) at 06:56

## 2020-05-18 RX ADMIN — HEPARIN SODIUM 5000 UNITS: 5000 INJECTION, SOLUTION INTRAVENOUS; SUBCUTANEOUS at 08:22

## 2020-05-18 RX ADMIN — FUROSEMIDE 60 MG: 10 INJECTION, SOLUTION INTRAMUSCULAR; INTRAVENOUS at 08:22

## 2020-05-18 RX ADMIN — ASPIRIN 81 MG: 81 TABLET, COATED ORAL at 08:22

## 2020-05-18 RX ADMIN — IPRATROPIUM BROMIDE AND ALBUTEROL SULFATE 3 ML: 2.5; .5 SOLUTION RESPIRATORY (INHALATION) at 12:19

## 2020-05-18 RX ADMIN — CLOPIDOGREL BISULFATE 75 MG: 75 TABLET ORAL at 08:22

## 2020-05-18 RX ADMIN — SODIUM CHLORIDE, PRESERVATIVE FREE 10 ML: 5 INJECTION INTRAVENOUS at 08:23

## 2020-05-18 NOTE — OUTREACH NOTE
Prep Survey      Responses   [facility] facility patient discharged from?  [facility]   Is LACE score < 7 ?  No   Eligibility  Readm Mgmt   Discharge diagnosis  syncope d/t vasovagal response (coughing), had CABG on 5/8   COVID-19 Test Status  Negative   Does the patient have one of the following disease processes/diagnoses(primary or secondary)?  Other   Does the patient have Home health ordered?  Yes   What is the Home health agency?   [facility]   Is there a DME ordered?  No   Prep survey completed?  Yes          Desire Watson RN

## 2020-05-18 NOTE — PROGRESS NOTES
"                                 [facility] Progress Note      LOS: 0 days   Patient Care Team:  Vaishali Massey APRN as PCP - General (Nurse Practitioner)    Chief Complaint:    Chief Complaint   Patient presents with   • Shortness of Breath       Subjective     Interval History:     Patient Complaints: Syncope; patient feels well at this time      Review of Systems:   A 14 point review of systems was negative except as was stated in the HPI      Objective     Vital Sign Min/Max for last 24 hours  Temp  Min: 97.5 °F (36.4 °C)  Max: 98.2 °F (36.8 °C)   BP  Min: 109/69  Max: 127/79   Pulse  Min: 86  Max: 102   Resp  Min: 16  Max: 20   SpO2  Min: 94 %  Max: 99 %   No data recorded   No data recorded     Flowsheet Rows      First Filed Value   Admission Height  165.1 cm (65\") Documented at 05/16/2020 2008   Admission Weight  69.4 kg (153 lb) Documented at 05/16/2020 2008          Physical Exam:  General Appearance: Alert, appears stated age and cooperative  Lungs: Clear to auscultation  Heart:: RRR   No Murmurs, Rubs or Gallops  Abdomen: Soft and nontender with adequate bowel sounds.  No organomegaly  Extremities: No cyanosis, clubbing or edema  Pulses: Pulses palpable and equal bilaterally  Skin: Warm and dry with no rash  Psych: Normal     Results Review:     I reviewed the patient's new clinical results.  Results from last 7 days   Lab Units 05/17/20 0644 05/16/20 2016 05/13/20 0412   SODIUM mmol/L 132* 129* 132*   POTASSIUM mmol/L 4.7 4.1 4.3   CHLORIDE mmol/L 99 92* 94*   CO2 mmol/L 20.0* 23.0 28.0   BUN mg/dL 11 13 16   CREATININE mg/dL 0.69* 0.82 0.72*   GLUCOSE mg/dL 124* 119* 121*   CALCIUM mg/dL 8.9 9.0 9.0     Results from last 7 days   Lab Units 05/17/20 0644 05/16/20 2016 05/13/20 0412   WBC 10*3/mm3 10.07 11.47* 9.30   HEMOGLOBIN g/dL 10.8* 10.8* 10.1*   HEMATOCRIT % 36.9* 33.4* 31.9*   PLATELETS 10*3/mm3 357 427 286     Lab Results   Lab Value Date/Time    TROPONINT 0.076 (C) " 05/17/2020 0644    TROPONINT 0.115 (C) 05/16/2020 2212    TROPONINT 0.133 (C) 05/16/2020 2016    TROPONINT 1.020 (C) 05/07/2020 2348    TROPONINT 1.270 (C) 05/07/2020 1806    TROPONINT 1.130 (C) 05/07/2020 1240                       Medication Review: yes  Current Facility-Administered Medications   Medication Dose Route Frequency Provider Last Rate Last Dose   • acetaminophen (TYLENOL) tablet 650 mg  650 mg Oral Q4H PRN Sofi Macias PA-C        Or   • acetaminophen (TYLENOL) 160 MG/5ML solution 650 mg  650 mg Oral Q4H PRN Sofi Macias PA-C        Or   • acetaminophen (TYLENOL) suppository 650 mg  650 mg Rectal Q4H PRN Sofi Macias PA-C       • aspirin EC tablet 81 mg  81 mg Oral Daily Sofi Macias PA-C   81 mg at 05/18/20 0822   • atorvastatin (LIPITOR) tablet 80 mg  80 mg Oral Nightly Sofi Macias PA-C   80 mg at 05/17/20 2122   • clopidogrel (PLAVIX) tablet 75 mg  75 mg Oral Daily Sofi Macias PA-C   75 mg at 05/18/20 0822   • guaiFENesin (MUCINEX) 12 hr tablet 600 mg  600 mg Oral Q12H Starla Humphreys MD   600 mg at 05/18/20 0822   • heparin (porcine) 5000 UNIT/ML injection 5,000 Units  5,000 Units Subcutaneous Q12H Starla Humphreys MD   5,000 Units at 05/18/20 0822   • ipratropium-albuterol (DUO-NEB) nebulizer solution 3 mL  3 mL Nebulization Q4H While Awake - RT Tristen Vail MD   3 mL at 05/18/20 0656   • latanoprost (XALATAN) 0.005 % ophthalmic solution 1 drop  1 drop Both Eyes Nightly Sofi Macias PA-C   1 drop at 05/17/20 2121   • melatonin tablet 5 mg  5 mg Oral Nightly PRN Sofi Macias PA-C       • sodium chloride 0.9 % flush 10 mL  10 mL Intravenous PRN Sofi Macias PA-C       • sodium chloride 0.9 % flush 10 mL  10 mL Intravenous Q12H Sofi Macias PA-C   10 mL at 05/18/20 0823   • sodium chloride 0.9 % flush 10 mL  10 mL Intravenous PRN Sofi Macias PA-C             Shortness of breath    Coronary artery disease involving native coronary artery of  native heart status post CABG x4 5/8/2020    Squamous cell carcinoma lung, left status post XRT    Stage IV COPD, with FEV1 of 38%    Hyponatremia    Anemia    Leukocytosis    Recurrent syncope        Impression      Cough syncope  Chronic lung disease  Recent CABG presently stable  Lung cancer status post XRT          Plan     He appears stable at the present time.  We will continue off of beta-blockers and monitor her rhythm.  MRI of brain on 5/17/2020 was unremarkable              Chandrakant Guerrero MD  05/18/20  09:29

## 2020-05-18 NOTE — PROGRESS NOTES
Discharge Planning Assessment  [facility]     Patient Name: Garrett Mcwilliams  MRN: [id number]  Today's Date: 5/18/2020    Admit Date: 5/16/2020    Discharge Needs Assessment     Row Name 05/18/20 1243       Living Environment    Lives With  spouse pt resides in [facility]    Name(s) of Who Lives With Patient  wifeChela Mcwilliams    Current Living Arrangements  home/apartment/condo    Primary Care Provided by  self    Provides Primary Care For  no one    Family Caregiver if Needed  child(aisha), adult;spouse    Family Caregiver Names  wife- Kristy and daughter Yasmin    Quality of Family Relationships  helpful;involved;supportive    Able to Return to Prior Arrangements  yes       Resource/Environmental Concerns    Resource/Environmental Concerns  none       Transition Planning    Patient/Family Anticipates Transition to  home with family;home with help/services    Patient/Family Anticipated Services at Transition  home health care    Transportation Anticipated  family or friend will provide       Discharge Needs Assessment    Concerns to be Addressed  discharge planning    Equipment Currently Used at Home  none    Anticipated Changes Related to Illness  none    Equipment Needed After Discharge  none    Outpatient/Agency/Support Group Needs  homecare agency    Discharge Facility/Level of Care Needs  home with home health    Provided Post Acute Provider List?  N/A    N/A Provider List Comment  pt is current with [facility] for nursing and Physcial therapy services status post cabg. Pt prefers to keep [facility] home health services at this time.     Patient's Choice of Community Agency(s)  [facility]        Discharge Plan     Row Name 05/18/20 1245       Plan    Plan  home with [facility]    Provided Post Acute Provider Quality & Resource List?  N/A    Patient/Family in Agreement with Plan  yes    Plan Comments  CM spoke with pt at bedside. Pt reports he returned home with his wife after a recent CABGx4. Pt  is current with [facility] for skilled nursing and physical therapy services. CM has faxed updated clinicals and d/c summary per request. Pt plans to return home with his wife and denies needs at this time. CM will continue to follow.     Final Discharge Disposition Code  06 - home with home health care        Destination      Coordination has not been started for this encounter.      Durable Medical Equipment      Coordination has not been started for this encounter.      Dialysis/Infusion      Coordination has not been started for this encounter.      Home Medical Care - Selection Complete      Service Provider Request Status Selected Services Address Phone Number Fax Number    [facility] Selected Home Health Services [address] [facility] [address] [place] [id number] [phone]      Therapy      Coordination has not been started for this encounter.      Community Resources      Coordination has not been started for this encounter.        Expected Discharge Date and Time     Expected Discharge Date Expected Discharge Time    May 18, 2020         Demographic Summary     Row Name 05/18/20 1233       General Information    Admission Type  observation    Referral Source  admission list    Reason for Consult  discharge planning    Preferred Language  English    General Information Comments  PCP- OWEN Knox        Contact Information    Permission Granted to Share Info With      Contact Information Comments  [phone]        Functional Status     Row Name 05/18/20 1243       Functional Status    Current Activity Tolerance  good       Functional Status, IADL    Medications  independent    Meal Preparation  independent    Housekeeping  independent    Laundry  independent    Shopping  independent       Employment/    Employment/ Comments  Pt confirms he has Medicare, denies concerns or disruption in coverage. Pt has prescription drug  coverage and denies issues obtaining or affording current medications.         Psychosocial    No documentation.       Abuse/Neglect    No documentation.       Legal    No documentation.       Substance Abuse    No documentation.       Patient Forms    No documentation.           Nataliia Ruiz RN

## 2020-05-18 NOTE — DISCHARGE PLACEMENT REQUEST
"Garrett Bonilla (69 y.o. Male)    - Nataliia Ruiz,-[phone] or [phone]     Observation status/ resume all previous orders    Date of Birth Social Security Number Address Home Phone MRN    1950  [address]   [facility] [address] [phone] [id number]    Advent Marital Status          None        Admission Date Admission Type Admitting Provider Attending Provider Department, Room/Bed    20 Emergency Wendy Martinez MD Reddy, Mayuri V, MD [facility] 2F, S210/1    Discharge Date Discharge Disposition Discharge Destination         Home or Self Care              Attending Provider:  Wendy Martinez MD    Allergies:  No Known Allergies    Isolation:  None   Infection:  None   Code Status:  CPR    Ht:  165.1 cm (65\")   Wt:  70.7 kg (155 lb 12.8 oz)    Admission Cmt:  None   Principal Problem:  Shortness of breath [R06.02]                 Active Insurance as of 2020     Primary Coverage     Payor Plan Insurance Group Employer/Plan Group    MEDICARE MEDICARE A & B      Payor Plan Address Payor Plan Phone Number Payor Plan Fax Number Effective Dates    [address] [phone]  2014 - None Entered    [place] [address]       Subscriber Name Subscriber Birth Date Member ID       GARRETT BONILLA 1950 [id number]                 Emergency Contacts      (Rel.) Home Phone Work Phone Mobile Phone    YADY BONILLA (Spouse) [phone] -- [phone]    HOUSTONKATHRYN GOSSA (Daughter) -- -- [phone]            Insurance Information                MEDICARE/MEDICARE A & B Phone: [phone]    Subscriber: Garrett Bonilla Subscriber#: [id number]    Group#:  Precert#:              History & Physical      Starla Humphreys MD at 200              [facility] Medicine Services  HISTORY AND PHYSICAL    Patient Name: Garrett Bonilla  : 1950  MRN: [id number]  Primary Care Physician: Vaishali Massey, APRN  Date of " admission: 5/16/2020      Subjective   Subjective     Chief Complaint:  Shortness of breath     HPI:  Garrett Mcwilliams is a 69 y.o. male with a PMHx of CAD s/p CABG, stage 3 squamous cell cancer of the lung  and stage IV COPD [facility] to [facility] ED c/o shortness of breath. The patient was recently hospitalized at [facility] from 5/7-5/13 and underwent a CABG by Dr. Pizarro on 5/8/20, his postop course was uncomplicated. After returning home the patient reports mulitple episodes that start as a cough and then he has seizure like activity followed  By syncope and return of consciousness.  He is NOT incontinent during these episodes.    The patient was seen at an OSH ED on 5/14/20 for these syncopal episodes and diagnosed with vasovagal syncope. The patient reports 4 additional episodes today so he decided to be reevaluated. The patient reports that he does produce phlegm, he reports difficulty clearing it he also has had wheezing.  He denies chest pain or palpitations. He denies fever, chills or body aches. He denies sick contacts. He denies N/V/D. Remote tobacco use.  He reports he is not been very active as he gets short of breath with any exertion.  His blood pressures been low at home.    While in the ED, the patient's BP has been low. Labs revealed wbc 11.47, hgb 10.8, hct 33.4, proBNP 2,325, troponin 0.133, sodium 129. Chest x-ray demonstrates stable left-sided volume loss and extensive left apical pulmonary scarring. ECG normal sinus rhythm with low voltage. The patient received a neb treatment in the ED. He will be admitted to the hospitalist service for further medical management.     Review of Systems   Constitutional: Negative for chills, diaphoresis, fatigue and fever.   HENT: Negative for congestion, sore throat and trouble swallowing.    Eyes: Negative for pain and visual disturbance.   Respiratory: Positive for cough (productive) and shortness of breath. Negative for wheezing.    Cardiovascular: Negative for chest  pain, palpitations and leg swelling.   Gastrointestinal: Negative for abdominal pain, blood in stool, constipation, diarrhea, nausea and vomiting.   Genitourinary: Negative for difficulty urinating and dysuria.   Musculoskeletal: Negative for back pain and gait problem.   Skin: Negative for rash and wound.   Neurological: Positive for syncope. Negative for dizziness, speech difficulty, weakness and headaches.   Hematological: Negative for adenopathy. Does not bruise/bleed easily.   Psychiatric/Behavioral: Negative for agitation and confusion.      All other systems reviewed and are negative.     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    Patient has completed radiation therapy in January of this year and then he started Keytruda which he has had every couple weeks since then for his stage III squamous cell cancer of the lung    Past Surgical History:   Procedure Laterality Date   • CORONARY ARTERY BYPASS GRAFT N/A 5/8/2020    Procedure: MEDIAN STERNOTOMY, CORONARY ARTERY BYPASS GRAFT  X4 , ENDOSCOPIC VEIN HARVESTING OF LEFT GREATER SAPHENOUS VEIN , EVH EXPLORATION OF RIGH TLEG;  Surgeon: Rony Pizarro MD;  Location: [facility];  Service: Cardiothoracic;  Laterality: N/A;  vo 1200  vr 1215   • PORTACATH PLACEMENT         Family History: family history includes Aneurysm in his father. Otherwise pertinent FHx was reviewed and unremarkable.     Social History:  reports that he quit smoking about 30 years ago. His smoking use included cigarettes. He has a 105.00 pack-year smoking history. He has never used smokeless tobacco. He reports that he does not drink alcohol or use drugs.  Social History     Social  History Narrative   • Not on file   He is , he is a retired .    Medications:  Available home medication information reviewed.  Prior to Admission medications    Medication Sig Start Date End Date Taking? Authorizing Provider   aspirin 81 MG EC tablet Take 1 tablet by mouth Daily. 5/14/20   Dutch Hernández PA   atorvastatin (LIPITOR) 80 MG tablet Take 1 tablet by mouth Every Night. 5/14/20   Ady Lynn PA   clopidogrel (PLAVIX) 75 MG tablet Take 1 tablet by mouth Daily. 5/14/20   Dutch Hernández PA   HYDROcodone-acetaminophen (NORCO) 7.5-325 MG per tablet Take 1 tablet by mouth Every 6 (Six) Hours As Needed for Moderate Pain  for up to 5 days. 5/13/20 5/20/20  Rony Pizarro MD   latanoprost (XALATAN) 0.005 % ophthalmic solution Administer 1 drop to both eyes Every Night.    Provider, MD Marsha   Loratadine (CLARITIN PO) Take 10 mg by mouth Daily As Needed (allergies).    Provider, MD Marsha   metoprolol tartrate (LOPRESSOR) 25 MG tablet Take 0.5 tablets by mouth Every 12 (Twelve) Hours. 5/14/20   Ady Lynn PA       No Known Allergies    Objective   Objective     Vital Signs:   Temp:  [97.8 °F (36.6 °C)] 97.8 °F (36.6 °C)  Heart Rate:  [] 94  Resp:  [20] 20  BP: (100-133)/(70-81) 100/70      94% on room air  Physical Exam   Patient is alert and talkative in no distress at rest he is quite alert and nontoxic although audibly wheezing.  Neck is without mass or JVD  Heart is Reg wo murmur, distant  Lungs are clear wo wheeze or crackle on the right however he does have inspiratory and expiratory wheeze on the left with decreased breath sounds on the left  His chest has a clean dry mid line incision as well as his mediastinal tube sites are clean without erythema.  Abd is soft without HSM or mass, not tender or distended  MAEW  Skin is without rash or edema  Neurologic exam in nonfocal   Mood is appropriate      Results Reviewed:  I have personally reviewed  current lab and radiology data.    Results from last 7 days   Lab Units 05/16/20 2016   WBC 10*3/mm3 11.47*   HEMOGLOBIN g/dL 10.8*   HEMATOCRIT % 33.4*   PLATELETS 10*3/mm3 427     Results from last 7 days   Lab Units 05/16/20 2016   SODIUM mmol/L 129*   POTASSIUM mmol/L 4.1   CHLORIDE mmol/L 92*   CO2 mmol/L 23.0   BUN mg/dL 13   CREATININE mg/dL 0.82   GLUCOSE mg/dL 119*   CALCIUM mg/dL 9.0   ALT (SGPT) U/L 41   AST (SGOT) U/L 25   TROPONIN T ng/mL 0.133*   PROBNP pg/mL 2,325.0*     Estimated Creatinine Clearance: 82.3 mL/min (by C-G formula based on SCr of 0.82 mg/dL).  Brief Urine Lab Results     None        Imaging Results (Last 24 Hours)     Procedure Component Value Units Date/Time    XR Chest 1 View [[id number]] Collected:  05/16/20 2057     Updated:  05/16/20 2059    Narrative:       CR Chest 1 Vw    INDICATION:   69-year-old male with shortness of air and cough today. Multiple syncopal episodes today.     COMPARISON:    Chest 5/13/2020    FINDINGS:  Single portable AP view(s) of the chest.  Chronic left-sided volume loss and extensive left apical pulmonary scarring. Right lung clear. No pneumothorax area no large pleural effusions. Heart size and mediastinum appear stable. Median sternotomy wires.  Right-sided Port-A-Cath again noted with catheter extending cephalad in the right internal jugular vein.      Impression:         1. No acute chest findings. Stable left-sided volume loss and extensive left apical pulmonary scarring.  2. Right-sided Port-A-Cath again noted with catheter extending cephalad in the right internal jugular vein.    Signer Name: Leandro Messina MD   Signed: 5/16/2020 8:57 PM   Workstation Name: [id number]    [facility]        Results for orders placed during the hospital encounter of 05/07/20   Adult Transthoracic Echocardiogram Complete    Narrative · The following left ventricular wall segments are hypokinetic: apical   anterior and apex hypokinetic.  ·  Estimated EF = 60%.  · Left ventricular systolic function is normal.  · Left ventricular diastolic dysfunction (grade I) consistent with   impaired relaxation.  · Mild tricuspid valve regurgitation is present.  · There is no evidence of a left ventricular mass or thrombus present.  · Normal right ventricular cavity size, wall thickness, systolic function   and septal motion noted.  · The aortic valve exhibits mild sclerosis.  · No evidence of pulmonary hypertension is present.  · There is no evidence of pericardial effusion.        Assessment/Plan   Assessment & Plan     Active Hospital Problems    Diagnosis POA   • **Shortness of breath [R06.02] Yes   • Recurrent syncope [R55] Yes     Priority: High   • Stage IV COPD, with FEV1 of 38% [J43.9] Yes     Priority: High   • Hyponatremia [E87.1] Yes   • Anemia [D64.9] Yes   • Leukocytosis [D72.829] Yes   • Squamous cell carcinoma lung, left status post XRT [C34.92] Yes   • Coronary artery disease involving native coronary artery of native heart status post CABG x4 5/8/2020 [I25.10] Yes     Mr. Mcwilliams is a 69 y.o. male with a PMHx of CAD s/p CABG, lung cancer and COPD presented to [facility] ED c/o shortness of breath with productive cough and syncope for several days. Underwent recent CABG by Dr. Pizarro on 5/8.     Shortness of breath / Cough   -- chest x-ray demonstrates stable left-sided volume loss and extensive left apical pulmonary scarring  -- obtain CTA of the chest to rule out tumor progression, mucous plugging of the left bronchus, or other obstructive issues.  Also rule out pericardial effusion.  -- respiratory panel and sputum culture   -- tested negative for COVID-19 on 5/7/20  -- monitor pulse ox   -- CT is negative for any acute changes-- will try mucinex, one dose of steroids- consider increasng inhaled medications at discharge-    Syncope  -- post tussive syncope likely vasovagal   -- ECG normal sinus with low voltage   -- echo 5/8/20 LVEF 60% with grade 1  diastolic dysfunction and apical and apex hypokinesis   -- carotid duplex 5/8/20 unremarkable  -- consider cardiology consult given recent CABG if nothing is found by initial studies.  -- orthostatic vitals, monitor telemetry, fall precautions   -- consider EEG and further neuro eval if not better with better pulm toilet    Hyponatremia with hyper tension  -- sodium 129, down from 132 on 5/13  -- obtain urine studies, avoid diuretics  -We will give normal saline 500    Leukocytosis-most likely stress response  -- obtain urinalysis and CT chest     Anemia  -- improved, monitor closely     CAD s/p CABG (5/8/20)  -- followed by CT surgeon, Dr. Pizarro   -- continue ASA, Plavix, Statin   -- hold metoprolol d/t low BP     COPD  -- nebs prn need COPD education.  His wife reports that no one is ever told him he has COPD.    DVT prophylaxis:  SCDs    Admission Communication  Due to current limited visitation policies, an attempt will be made to update patient's identified best point-of-contact(s) at admission to discuss plan of care.   I spoke with his wife at length by phone who provided some of the history.  She is can go home tonight and if she is at home her cell phone does not work and appreciates is calling her home phone.              CODE STATUS:    Code Status and Medical Interventions:   Ordered at: 05/16/20 5601     Level Of Support Discussed With:    Patient     Code Status:    CPR     Medical Interventions (Level of Support Prior to Arrest):    Full     Admission Status:  I believe this patient meets OBSERVATION status, however if further evaluation or treatment plans warrant, status may change.  Based upon current information, I predict patient's care encounter to be less than or equal to 2 midnights.  History initiated and Electronically signed by Sofi Macias PA-C, 05/16/20, 10:31 PM.  Further history, exam and plan by Electronically signed by Starla Humphreys MD, 05/16/20, 11:54 PM.      Electronically  "signed by Starla Humphreys MD at 05/17/20 0049          Physician Progress Notes (last 24 hours) (Notes from 05/17/20 1242 through 05/18/20 1242)      Chandrakant Guerrero MD at 05/18/20 0929                                           [facility] Progress Note      LOS: 0 days   Patient Care Team:  Vaishali Massey APRN as PCP - General (Nurse Practitioner)    Chief Complaint:    Chief Complaint   Patient presents with   • Shortness of Breath       Subjective     Interval History:     Patient Complaints: Syncope; patient feels well at this time      Review of Systems:   A 14 point review of systems was negative except as was stated in the HPI      Objective     Vital Sign Min/Max for last 24 hours  Temp  Min: 97.5 °F (36.4 °C)  Max: 98.2 °F (36.8 °C)   BP  Min: 109/69  Max: 127/79   Pulse  Min: 86  Max: 102   Resp  Min: 16  Max: 20   SpO2  Min: 94 %  Max: 99 %   No data recorded   No data recorded     Flowsheet Rows      First Filed Value   Admission Height  165.1 cm (65\") Documented at 05/16/2020 2008   Admission Weight  69.4 kg (153 lb) Documented at 05/16/2020 2008          Physical Exam:  General Appearance: Alert, appears stated age and cooperative  Lungs: Clear to auscultation  Heart:: RRR   No Murmurs, Rubs or Gallops  Abdomen: Soft and nontender with adequate bowel sounds.  No organomegaly  Extremities: No cyanosis, clubbing or edema  Pulses: Pulses palpable and equal bilaterally  Skin: Warm and dry with no rash  Psych: Normal     Results Review:     I reviewed the patient's new clinical results.  Results from last 7 days   Lab Units 05/17/20 0644 05/16/20 2016 05/13/20  0412   SODIUM mmol/L 132* 129* 132*   POTASSIUM mmol/L 4.7 4.1 4.3   CHLORIDE mmol/L 99 92* 94*   CO2 mmol/L 20.0* 23.0 28.0   BUN mg/dL 11 13 16   CREATININE mg/dL 0.69* 0.82 0.72*   GLUCOSE mg/dL 124* 119* 121*   CALCIUM mg/dL 8.9 9.0 9.0     Results from last 7 days   Lab Units 05/17/20 0644 05/16/20 2016 " 05/13/20  0412   WBC 10*3/mm3 10.07 11.47* 9.30   HEMOGLOBIN g/dL 10.8* 10.8* 10.1*   HEMATOCRIT % 36.9* 33.4* 31.9*   PLATELETS 10*3/mm3 357 427 286     Lab Results   Lab Value Date/Time    TROPONINT 0.076 (C) 05/17/2020 0644    TROPONINT 0.115 (C) 05/16/2020 2212    TROPONINT 0.133 (C) 05/16/2020 2016    TROPONINT 1.020 (C) 05/07/2020 2348    TROPONINT 1.270 (C) 05/07/2020 1806    TROPONINT 1.130 (C) 05/07/2020 1240                       Medication Review: yes  Current Facility-Administered Medications   Medication Dose Route Frequency Provider Last Rate Last Dose   • acetaminophen (TYLENOL) tablet 650 mg  650 mg Oral Q4H PRN Sofi Macias PA-C        Or   • acetaminophen (TYLENOL) 160 MG/5ML solution 650 mg  650 mg Oral Q4H PRN Sofi Macias PA-C        Or   • acetaminophen (TYLENOL) suppository 650 mg  650 mg Rectal Q4H PRN Sofi Macias PA-C       • aspirin EC tablet 81 mg  81 mg Oral Daily Sofi Macias PA-C   81 mg at 05/18/20 0822   • atorvastatin (LIPITOR) tablet 80 mg  80 mg Oral Nightly Sofi Macias PA-C   80 mg at 05/17/20 2122   • clopidogrel (PLAVIX) tablet 75 mg  75 mg Oral Daily Sofi Macias PA-C   75 mg at 05/18/20 0822   • guaiFENesin (MUCINEX) 12 hr tablet 600 mg  600 mg Oral Q12H Starla Humphreys MD   600 mg at 05/18/20 0822   • heparin (porcine) 5000 UNIT/ML injection 5,000 Units  5,000 Units Subcutaneous Q12H Starla Humphreys MD   5,000 Units at 05/18/20 0822   • ipratropium-albuterol (DUO-NEB) nebulizer solution 3 mL  3 mL Nebulization Q4H While Awake - RT Tristen Vail MD   3 mL at 05/18/20 0656   • latanoprost (XALATAN) 0.005 % ophthalmic solution 1 drop  1 drop Both Eyes Nightly Sofi Macias PA-C   1 drop at 05/17/20 2121   • melatonin tablet 5 mg  5 mg Oral Nightly PRN Sofi Macias PA-C       • sodium chloride 0.9 % flush 10 mL  10 mL Intravenous PRN Sofi Macias PA-C       • sodium chloride 0.9 % flush 10 mL  10 mL Intravenous Q12H Sofi Macias,  MIKEY   10 mL at 05/18/20 0823   • sodium chloride 0.9 % flush 10 mL  10 mL Intravenous PRN Sofi Macias PA-C             Shortness of breath    Coronary artery disease involving native coronary artery of native heart status post CABG x4 5/8/2020    Squamous cell carcinoma lung, left status post XRT    Stage IV COPD, with FEV1 of 38%    Hyponatremia    Anemia    Leukocytosis    Recurrent syncope        Impression      Cough syncope  Chronic lung disease  Recent CABG presently stable  Lung cancer status post XRT          Plan     He appears stable at the present time.  We will continue off of beta-blockers and monitor her rhythm.  MRI of brain on 5/17/2020 was unremarkable              Chandrakant Guerrero MD  05/18/20  09:29          Electronically signed by Chandrakant Guerrero MD at 05/18/20 0931     Rony Pizarro MD at 05/18/20 0709          CTS Progress Note       LOS: 0 days   Patient Care Team:  Vaishali Massey APRN as PCP - General (Nurse Practitioner)    Chief Complaint: Shortness of breath    Vital Signs:  Temp:  [97.5 °F (36.4 °C)-98 °F (36.7 °C)] 98 °F (36.7 °C)  Heart Rate:  [86-96] 90  Resp:  [16-20] 20  BP: (106-127)/(64-86) 125/79    Physical Exam: Alert conversant sternum remained stable breathing unlabored       Results:   Results from last 7 days   Lab Units 05/17/20  0644   WBC 10*3/mm3 10.07   HEMOGLOBIN g/dL 10.8*   HEMATOCRIT % 36.9*   PLATELETS 10*3/mm3 357     Results from last 7 days   Lab Units 05/17/20  0644   SODIUM mmol/L 132*   POTASSIUM mmol/L 4.7   CHLORIDE mmol/L 99   CO2 mmol/L 20.0*   BUN mg/dL 11   CREATININE mg/dL 0.69*   GLUCOSE mg/dL 124*   CALCIUM mg/dL 8.9           Imaging Results (Last 24 Hours)     Procedure Component Value Units Date/Time    MRI Brain With & Without Contrast [[id number]] Resulted:  05/17/20 2024     Updated:  05/17/20 2044          Assessment      Shortness of breath    Coronary artery disease involving native coronary artery of native heart  status post CABG x4 2020    Squamous cell carcinoma lung, left status post XRT    Stage IV COPD, with FEV1 of 38%    Hyponatremia    Anemia    Leukocytosis    Recurrent syncope    Patient is status post recent four-vessel coronary bypass grafting surgery on May 8 of this year.  He had developed paroxysms of coughing after his discharge.  He described syncopal events during his coughing episodes.  CT scan of the chest was unremarkable aside from his known left-sided radiation treatments.  MRI of the brain result is pending.  However patient is on room air with a saturation of 98% he is in a sinus rhythm with a blood pressure in the 120s and he says he has not coughed since yesterday afternoon.  At this point I have nothing further to add from a surgical standpoint.    Plan   Lasix 60 mg IV x1.    Please note that portions of this note were completed with a voice recognition program. Efforts were made to edit the dictations, but occasionally words are mistranscribed.    Rony Pizarro MD  20  07:09        Electronically signed by Rony Pizarro MD at 20 0711     Quincy Lopez MD at 20 1433              [facility] Medicine Services  PROGRESS NOTE    Patient Name: Garrett Mcwilliams  : 1950  MRN: [id number]    Date of Admission: 2020  Primary Care Physician: Vaishali Massey APRN    Subjective   Subjective     CC:  Presyncopal episodes.    HPI:  Resting in bed in no acute distress and overall comfortable.  No fever or chills.  No chest pain, palpitation, shortness of breath at rest.  Has his chronic cough but no headache.  No nausea, vomiting, diarrhea, abdominal pain.    Review of Systems      Objective   Objective     Vital Signs:   Temp:  [97.8 °F (36.6 °C)-98.3 °F (36.8 °C)] 97.9 °F (36.6 °C)  Heart Rate:  [] 93  Resp:  [18-20] 18  BP: (100-133)/(64-86) 114/86        Physical Exam:  Constitutional: No acute distress  HENT: NCAT, mucous membranes  moist  Respiratory: Expiratory wheezing, diminished breath sounds, respiratory effort normal   Cardiovascular: RRR, no murmurs, rubs, or gallops  Gastrointestinal: Positive bowel sounds, soft, nontender, nondistended  Musculoskeletal: No bilateral ankle edema  Psychiatric: Appropriate affect, cooperative  Neurologic: Awake, alert, follows commands.  No focality.  Speech clear  Skin: No rashes    Results Reviewed:  Results from last 7 days   Lab Units 05/17/20 0644 05/16/20 2212 05/16/20 2016 05/13/20 0412   WBC 10*3/mm3 10.07  --  11.47* 9.30   HEMOGLOBIN g/dL 10.8*  --  10.8* 10.1*   HEMATOCRIT % 36.9*  --  33.4* 31.9*   PLATELETS 10*3/mm3 357  --  427 286   PROCALCITONIN ng/mL  --  0.12  --   --      Results from last 7 days   Lab Units 05/17/20 0644 05/16/20 2212 05/16/20 2016 05/13/20  0412   SODIUM mmol/L 132*  --  129* 132*   POTASSIUM mmol/L 4.7  --  4.1 4.3   CHLORIDE mmol/L 99  --  92* 94*   CO2 mmol/L 20.0*  --  23.0 28.0   BUN mg/dL 11  --  13 16   CREATININE mg/dL 0.69*  --  0.82 0.72*   GLUCOSE mg/dL 124*  --  119* 121*   CALCIUM mg/dL 8.9  --  9.0 9.0   ALT (SGPT) U/L  --   --  41  --    AST (SGOT) U/L  --   --  25  --    TROPONIN T ng/mL 0.076* 0.115* 0.133*  --    PROBNP pg/mL  --   --  2,325.0*  --      Estimated Creatinine Clearance: 85.9 mL/min (A) (by C-G formula based on SCr of 0.69 mg/dL (L)).    Microbiology Results Abnormal     Procedure Component Value - Date/Time    Respiratory Culture - Sputum, Cough [[id number]] Collected:  05/17/20 0615    Lab Status:  Preliminary result Specimen:  Sputum from Cough Updated:  05/17/20 0934     Gram Stain Rare (1+) Epithelial cells per low power field      Rare (1+) WBCs per low power field      Few (2+) Mixed bacterial morphotypes seen on Gram Stain    Eosinophil Smear - Urine, Urine, Clean Catch [[id number]]  (Normal) Collected:  05/17/20 0111    Lab Status:  Final result Specimen:  Urine, Clean Catch Updated:  05/17/20 0451     Eosinophil Smear 0  % EOS/100 Cells     Narrative:       No eosinophil seen    Respiratory Panel, PCR - Swab, Nasopharynx [[id number]]  (Normal) Collected:  05/17/20 0117    Lab Status:  Final result Specimen:  Swab from Nasopharynx Updated:  05/17/20 0403     ADENOVIRUS, PCR Not Detected     Coronavirus 229E Not Detected     Coronavirus HKU1 Not Detected     Coronavirus NL63 Not Detected     Coronavirus OC43 Not Detected     Human Metapneumovirus Not Detected     Human Rhinovirus/Enterovirus Not Detected     Influenza B PCR Not Detected     Parainfluenza Virus 1 Not Detected     Parainfluenza Virus 2 Not Detected     Parainfluenza Virus 3 Not Detected     Parainfluenza Virus 4 Not Detected     Bordetella pertussis pcr Not Detected     Influenza A H1 2009 PCR Not Detected     Chlamydophila pneumoniae PCR Not Detected     Mycoplasma pneumo by PCR Not Detected     Influenza A PCR Not Detected     Influenza A H3 Not Detected     Influenza A H1 Not Detected     RSV, PCR Not Detected     Bordetella parapertussis PCR Not Detected    Narrative:       The coronavirus on the RVP is NOT COVID-19 and is NOT indicative of infection with COVID-19.           Imaging Results (Last 24 Hours)     Procedure Component Value Units Date/Time    CT Angiogram Chest With & Without Contrast [[id number]] Collected:  05/16/20 2356     Updated:  05/16/20 2358    Narrative:       CT ANGIOGRAM CHEST W WO CONTRAST    INDICATION:   History of left-sided lung cancer. Coronary artery disease. Shortness of air and cough today. Syncope.    TECHNIQUE:   CT angiogram of the chest with IV contrast. 3-D reconstructions were obtained and reviewed.   Radiation dose reduction techniques included automated exposure control or exposure modulation based on body size. Count of known CT and cardiac nuc med studies  performed in previous 12 months: 0.     COMPARISON:   None available.    FINDINGS:   No pulmonary embolism or aortic dissection is identified. There is atherosclerotic  disease and coronary artery disease. Patient is status post CABG. There is a small pericardial effusion. There is a trace amount of left pleural fluid. There is a trace  amount of right pleural fluid with adjacent atelectasis in the right lower lobe. There is COPD. Right lung is otherwise clear. Volume loss is noted in the left hemithorax with extensive fibrosis in the left upper lung, presumably related to prior  radiation therapy. Correlate with history. Bandlike areas of scarring or atelectasis are present at the left lung base. No CT findings present to indicate pneumonia. Note: CT may be negative in the earliest stages of COVID-19. No pneumothorax is  identified. There are multiple gas bubbles in the epicardial fat near the cardiac apex on the left. This is likely related to recent CABG. No suspicious osseous lesions are identified. The tip of the patient's right-sided Port-A-Cath appears to terminate  in the right internal jugular vein. Upper abdomen shows colonic diverticulosis.      Impression:         1. No PE or aortic dissection.  2. Chronic fibrosis and volume loss in the left upper lung is presumably related to radiation therapy.  3. Recent CABG with a small pericardial effusion. There are also trace bilateral pleural effusions with some atelectasis in the lower lobes.  4. COPD. No CT findings present to indicate pneumonia. Note: CT may be negative in the earliest stages of COVID-19.    Signer Name: Rony Nicole MD   Signed: 5/16/2020 11:56 PM   Workstation Name: [id number]    [facility]    XR Chest 1 View [[id number]] Collected:  05/16/20 2057     Updated:  05/16/20 2059    Narrative:       CR Chest 1 Vw    INDICATION:   69-year-old male with shortness of air and cough today. Multiple syncopal episodes today.     COMPARISON:    Chest 5/13/2020    FINDINGS:  Single portable AP view(s) of the chest.  Chronic left-sided volume loss and extensive left apical pulmonary scarring.  Right lung clear. No pneumothorax area no large pleural effusions. Heart size and mediastinum appear stable. Median sternotomy wires.  Right-sided Port-A-Cath again noted with catheter extending cephalad in the right internal jugular vein.      Impression:         1. No acute chest findings. Stable left-sided volume loss and extensive left apical pulmonary scarring.  2. Right-sided Port-A-Cath again noted with catheter extending cephalad in the right internal jugular vein.    Signer Name: Leandro Messina MD   Signed: 5/16/2020 8:57 PM   Workstation Name: [id number]    [facility]          Results for orders placed during the hospital encounter of 05/07/20   Adult Transthoracic Echocardiogram Complete    Narrative · The following left ventricular wall segments are hypokinetic: apical   anterior and apex hypokinetic.  · Estimated EF = 60%.  · Left ventricular systolic function is normal.  · Left ventricular diastolic dysfunction (grade I) consistent with   impaired relaxation.  · Mild tricuspid valve regurgitation is present.  · There is no evidence of a left ventricular mass or thrombus present.  · Normal right ventricular cavity size, wall thickness, systolic function   and septal motion noted.  · The aortic valve exhibits mild sclerosis.  · No evidence of pulmonary hypertension is present.  · There is no evidence of pericardial effusion.          I have reviewed the medications:  Scheduled Meds:  aspirin 81 mg Oral Daily   atorvastatin 80 mg Oral Nightly   clopidogrel 75 mg Oral Daily   guaiFENesin 600 mg Oral Q12H   heparin (porcine) 5,000 Units Subcutaneous Q12H   ipratropium-albuterol 3 mL Nebulization Q4H While Awake - RT   latanoprost 1 drop Both Eyes Nightly   sodium chloride 10 mL Intravenous Q12H     Continuous Infusions:   PRN Meds:.•  acetaminophen **OR** acetaminophen **OR** acetaminophen  •  melatonin  •  sodium chloride  •  sodium chloride    Assessment/Plan   Assessment & Plan      Active Hospital Problems    Diagnosis  POA   • **Shortness of breath [R06.02]  Yes   • Hyponatremia [E87.1]  Yes   • Anemia [D64.9]  Yes   • Leukocytosis [D72.829]  Yes   • Recurrent syncope [R55]  Yes   • Squamous cell carcinoma lung, left status post XRT [C34.92]  Yes   • Stage IV COPD, with FEV1 of 38% [J43.9]  Yes   • Coronary artery disease involving native coronary artery of native heart status post CABG x4 5/8/2020 [I25.10]  Yes      Resolved Hospital Problems   No resolved problems to display.        Brief Hospital Course to date:  Garrett Mcwilliams is a 69 y.o. male with past medical history significant for coronary artery disease status post CABG recently, stage IV COPD, stage III squamous cell lung cancer.  Patient was recently hospitalized here at [facility] and had CABG done on 5/8/2020.  After discharge evidently patient has had multiple episodes of presyncope.  These episodes happen even when patient is lying down but most of the time if not all the time it happens after a coughing spell.  Patient has gone to local emergency room for that.  Now patient comes back because these episodes continue.  Cardiology has seen and evaluated the patient.  In their assessment these episodes or post to severe presyncopal episodes.  Patient also had tachycardia on presentation.  While in the hospital also with evidence of atrial fibrillation which is new.    *Presyncopal episodes, most likely posttussive vasovagal.  Also, seems that patient has low blood pressure during every episode.  Cardiology is on board.    *OPD and lung cancer.  Patient has his chronic cough but respiratory wise seems to be close to baseline.    *Tachycardia with new A. fib.  Cardiology is following.    *Coronary artery disease status post CABG on 5/8/2020.    *Mild hyponatremia.     PLAN:  - continue current care  -Beta-blockers and management of atrial fibrillation and tachycardia per cardiology.  - labs in am, monitor  na+      DVT Prophylaxis:     Daily Care Communication  Due to current limited visitation policies, an attempt will be made daily to update patient's identified best point-of-contact(s)   Contact: Kristy Mcwilliams   Relation: wife   Type of communication ([phone] ):   Time of communication: 2;45 PM   Notes (if applicable): updated her and answered all of her questions     Disposition: I expect the patient to be discharged  When ok with cardiology..    CODE STATUS:   Code Status and Medical Interventions:   Ordered at: 20 2334     Level Of Support Discussed With:    Patient     Code Status:    CPR     Medical Interventions (Level of Support Prior to Arrest):    Full         Electronically signed by Quincy Lopez MD, 20, 14:33.        Electronically signed by Quincy Lopez MD at 20 1456       Consult Notes (last 24 hours) (Notes from 20 1242 through 20 1242)    No notes of this type exist for this encounter.         Physical Therapy Notes (last 24 hours) (Notes from 20 1242 through 20 1242)    No notes exist for this encounter.         Occupational Therapy Notes (last 24 hours) (Notes from 20 1242 through 20 1242)    No notes exist for this encounter.            Discharge Summary      Wendy Martinez MD at 20 1151              [facility]  DISCHARGE SUMMARY    Patient Name: Garrett Mcwilliams  : 1950  MRN: [id number]    Date of Admission: 2020  8:09 PM  Date of Discharge:  20  Primary Care Physician: Vaishali Massey APRN    Consults     Date and Time Order Name Status Description    2020 0840 Inpatient Cardiology Consult Completed     2020 0044 Inpatient Cardiothoracic Surgery Consult      2020 1430 Inpatient Consult to Cardiology Completed     2020 1227 Inpatient Cardiothoracic Surgery Consult Completed           Hospital Course     Presenting Problem:   Shortness of breath  [R06.02]    Active Hospital Problems    Diagnosis  POA   • Hyponatremia [E87.1]  Yes   • Anemia [D64.9]  Yes   • Leukocytosis [D72.829]  Yes   • Squamous cell carcinoma lung, left status post XRT [C34.92]  Yes   • Stage IV COPD, with FEV1 of 38% [J43.9]  Yes   • Coronary artery disease involving native coronary artery of native heart status post CABG x4 5/8/2020 [I25.10]  Yes      Resolved Hospital Problems    Diagnosis Date Resolved POA   • **Shortness of breath [R06.02] 05/18/2020 Yes   • Recurrent syncope [R55] 05/18/2020 Yes          Hospital Course:  Garrett Mcwilliams is a 69 y.o. male with hx of CAD s/p CABG on 5/8/20, COPD, and lung cancer who presents to the ER due to multiple episodes of presyncope and syncope. These occur after coughing spells. Cardiology was consulted, agree that these episodes are likely due to post-tussive vasovagal response. He also has low blood pressure during these episodes. Cardiology discontinued his Metoprolol and recommends continuing off of it for now. Echo during previous admission 5/8 showed EF 60%, diastolic dysfunction. MRI brain was also obtained due to hx of lung cancer and recurrent syncope, and this was unremarkable. CT Surgery gave a dose of IV Lasix today for some small pleural effusions seen on imaging (CTA no evidence of PE). He is stable today and ready for discharge home. Has appointment with his oncologist tomorrow to continue treatment for his lung cancer.     Of note, patient had a questionable episode of paroxysmal Afib 5/16. Cardiology has followed, no further workup or treatment at this time.      Discharge Follow Up Recommendations for outpatient labs/diagnostics:  F/U with PCP in 1 week    Day of Discharge     HPI:   Patient seen this morning. Feels much better.    Review of Systems  Gen-no fevers, no chills  CV-no chest pain, no palpitations  Resp-no cough, no dyspnea  GI-no N/V/D, no abd pain    All other systems reviewed and negative except any additional  pertinent positives and negatives as discussed in HPI.      Vital Signs:   Temp:  [97.5 °F (36.4 °C)-98.2 °F (36.8 °C)] 98.2 °F (36.8 °C)  Heart Rate:  [] 96  Resp:  [16-20] 18  BP: (109-127)/(69-80) 115/75     Physical Exam:  Gen-no acute distress  HENT-NCAT, mucous membranes moist  CV-RRR, S1 S2 normal, no m/r/g  Resp-diminished bilaterally, no wheezes or rales  Abd-soft, NT, ND, +BS  Ext-no edema  Neuro-A&Ox3, no focal deficits  Skin-no rashes, well healing midsternal incision  Psych-appropriate mood      Pertinent  and/or Most Recent Results     Results from last 7 days   Lab Units 05/17/20  0644 05/16/20 2016 05/13/20  0412 05/12/20  0342   WBC 10*3/mm3 10.07 11.47* 9.30  --    HEMOGLOBIN g/dL 10.8* 10.8* 10.1* 9.5*   HEMATOCRIT % 36.9* 33.4* 31.9* 29.8*   PLATELETS 10*3/mm3 357 427 286  --    SODIUM mmol/L 132* 129* 132* 133*   POTASSIUM mmol/L 4.7 4.1 4.3 4.2   CHLORIDE mmol/L 99 92* 94* 94*   CO2 mmol/L 20.0* 23.0 28.0 28.0   BUN mg/dL 11 13 16 14   CREATININE mg/dL 0.69* 0.82 0.72* 0.74*   GLUCOSE mg/dL 124* 119* 121* 125*   CALCIUM mg/dL 8.9 9.0 9.0 9.0     Results from last 7 days   Lab Units 05/16/20 2016   BILIRUBIN mg/dL 0.6   ALK PHOS U/L 55   ALT (SGPT) U/L 41   AST (SGOT) U/L 25           Invalid input(s): TG, LDLCALC, LDLREALC  Results from last 7 days   Lab Units 05/17/20  0644 05/16/20 2212 05/16/20 2016   CORTISOL mcg/dL 6.98  --   --    PROBNP pg/mL  --   --  2,325.0*   TROPONIN T ng/mL 0.076* 0.115* 0.133*   PROCALCITONIN ng/mL  --  0.12  --        Brief Urine Lab Results  (Last result in the past 365 days)      Color   Clarity   Blood   Leuk Est   Nitrite   Protein   CREAT   Urine HCG        05/17/20 0112             30.9             Microbiology Results Abnormal     Procedure Component Value - Date/Time    Respiratory Culture - Sputum, Cough [[id number]] Collected:  05/17/20 0615    Lab Status:  Preliminary result Specimen:  Sputum from Cough Updated:  05/18/20 0919     Respiratory  Culture Light growth (2+) Normal Respiratory Cece     Gram Stain Rare (1+) Epithelial cells per low power field      Rare (1+) WBCs per low power field      Few (2+) Mixed bacterial morphotypes seen on Gram Stain    Eosinophil Smear - Urine, Urine, Clean Catch [[id number]]  (Normal) Collected:  05/17/20 0111    Lab Status:  Final result Specimen:  Urine, Clean Catch Updated:  05/17/20 0451     Eosinophil Smear 0 % EOS/100 Cells     Narrative:       No eosinophil seen    Respiratory Panel, PCR - Swab, Nasopharynx [[id number]]  (Normal) Collected:  05/17/20 0117    Lab Status:  Final result Specimen:  Swab from Nasopharynx Updated:  05/17/20 0403     ADENOVIRUS, PCR Not Detected     Coronavirus 229E Not Detected     Coronavirus HKU1 Not Detected     Coronavirus NL63 Not Detected     Coronavirus OC43 Not Detected     Human Metapneumovirus Not Detected     Human Rhinovirus/Enterovirus Not Detected     Influenza B PCR Not Detected     Parainfluenza Virus 1 Not Detected     Parainfluenza Virus 2 Not Detected     Parainfluenza Virus 3 Not Detected     Parainfluenza Virus 4 Not Detected     Bordetella pertussis pcr Not Detected     Influenza A H1 2009 PCR Not Detected     Chlamydophila pneumoniae PCR Not Detected     Mycoplasma pneumo by PCR Not Detected     Influenza A PCR Not Detected     Influenza A H3 Not Detected     Influenza A H1 Not Detected     RSV, PCR Not Detected     Bordetella parapertussis PCR Not Detected    Narrative:       The coronavirus on the RVP is NOT COVID-19 and is NOT indicative of infection with COVID-19.           Imaging Results (All)     Procedure Component Value Units Date/Time    MRI Brain With & Without Contrast [[id number]] Collected:  05/18/20 0841     Updated:  05/18/20 0852    Narrative:       EXAMINATION: MRI BRAIN W WO CONTRAST-      INDICATION: seizure, known lung cancer; R05-Cough; R06.02-Shortness of  breath; Z86.79-Personal history of other diseases of the circulatory  system;  Z95.1-Presence of aortocoronary bypass graft; Z87.09-Personal  history of other diseases of the respiratory system     TECHNIQUE: Sagittal and axial T1 and axial T2 FLAIR diffusion-weighted  images the brain, coronal T2-weighted images, post contrast axial  sagittal coronal T1-weighted images, and thin section axial [employer]  therapy T1-weighted images.     COMPARISON: NONE     FINDINGS: Patient history indicates squamous cell carcinoma of the lung.     No evidence of restricted diffusion is seen to suggest acute infarction.  Remaining imaging sequences show moderately advanced generalized  cerebral atrophy, and associated enlargement of the lateral ventricles,  with relative sparing of the temporal horns. There is minimal central  white matter change, not unusual for age. Unenhanced images show no  evidence of mass or mass effect, no evidence of edema, no signal changes  suggestive of hemorrhage,, or abnormal extra-axial collection.     Post contrast images show no evidence of pathologic brain or meningeal  enhancement.          Impression:       No evidence of intracranial metastatic disease or other  acute disease.             CT Angiogram Chest With & Without Contrast [[id number]] Collected:  05/16/20 2356     Updated:  05/16/20 2358    Narrative:       CT ANGIOGRAM CHEST W WO CONTRAST    INDICATION:   History of left-sided lung cancer. Coronary artery disease. Shortness of air and cough today. Syncope.    TECHNIQUE:   CT angiogram of the chest with IV contrast. 3-D reconstructions were obtained and reviewed.   Radiation dose reduction techniques included automated exposure control or exposure modulation based on body size. Count of known CT and cardiac nuc med studies  performed in previous 12 months: 0.     COMPARISON:   None available.    FINDINGS:   No pulmonary embolism or aortic dissection is identified. There is atherosclerotic disease and coronary artery disease. Patient is status post CABG. There is a  small pericardial effusion. There is a trace amount of left pleural fluid. There is a trace  amount of right pleural fluid with adjacent atelectasis in the right lower lobe. There is COPD. Right lung is otherwise clear. Volume loss is noted in the left hemithorax with extensive fibrosis in the left upper lung, presumably related to prior  radiation therapy. Correlate with history. Bandlike areas of scarring or atelectasis are present at the left lung base. No CT findings present to indicate pneumonia. Note: CT may be negative in the earliest stages of COVID-19. No pneumothorax is  identified. There are multiple gas bubbles in the epicardial fat near the cardiac apex on the left. This is likely related to recent CABG. No suspicious osseous lesions are identified. The tip of the patient's right-sided Port-A-Cath appears to terminate  in the right internal jugular vein. Upper abdomen shows colonic diverticulosis.      Impression:         1. No PE or aortic dissection.  2. Chronic fibrosis and volume loss in the left upper lung is presumably related to radiation therapy.  3. Recent CABG with a small pericardial effusion. There are also trace bilateral pleural effusions with some atelectasis in the lower lobes.  4. COPD. No CT findings present to indicate pneumonia. Note: CT may be negative in the earliest stages of COVID-19.    Signer Name: Rony Nicole MD   Signed: 5/16/2020 11:56 PM   Workstation Name: [id number]    [facility]    XR Chest 1 View [[id number]] Collected:  05/16/20 2057     Updated:  05/16/20 2059    Narrative:       CR Chest 1 Vw    INDICATION:   69-year-old male with shortness of air and cough today. Multiple syncopal episodes today.     COMPARISON:    Chest 5/13/2020    FINDINGS:  Single portable AP view(s) of the chest.  Chronic left-sided volume loss and extensive left apical pulmonary scarring. Right lung clear. No pneumothorax area no large pleural effusions. Heart size  and mediastinum appear stable. Median sternotomy wires.  Right-sided Port-A-Cath again noted with catheter extending cephalad in the right internal jugular vein.      Impression:         1. No acute chest findings. Stable left-sided volume loss and extensive left apical pulmonary scarring.  2. Right-sided Port-A-Cath again noted with catheter extending cephalad in the right internal jugular vein.    Signer Name: Leandro Messina MD   Signed: 5/16/2020 8:57 PM   Workstation Name: [email or web address]    [facility]          Results for orders placed during the hospital encounter of 05/07/20   Carotid Duplex - Bilateral    Narrative · Proximal right internal carotid artery plaque without significant   stenosis.  · Right internal carotid artery stenosis of 0-49%.  · Proximal left internal carotid artery plaque without significant   stenosis.  · Left internal carotid artery stenosis of 0-49%.  · Nominal antegrade bilateral vertebral artery flow demonstrated.          Results for orders placed during the hospital encounter of 05/07/20   Carotid Duplex - Bilateral    Narrative · Proximal right internal carotid artery plaque without significant   stenosis.  · Right internal carotid artery stenosis of 0-49%.  · Proximal left internal carotid artery plaque without significant   stenosis.  · Left internal carotid artery stenosis of 0-49%.  · Nominal antegrade bilateral vertebral artery flow demonstrated.          Results for orders placed during the hospital encounter of 05/07/20   Adult Transthoracic Echocardiogram Complete    Narrative · The following left ventricular wall segments are hypokinetic: apical   anterior and apex hypokinetic.  · Estimated EF = 60%.  · Left ventricular systolic function is normal.  · Left ventricular diastolic dysfunction (grade I) consistent with   impaired relaxation.  · Mild tricuspid valve regurgitation is present.  · There is no evidence of a left ventricular mass or thrombus  present.  · Normal right ventricular cavity size, wall thickness, systolic function   and septal motion noted.  · The aortic valve exhibits mild sclerosis.  · No evidence of pulmonary hypertension is present.  · There is no evidence of pericardial effusion.           Order Current Status    Respiratory Culture - Sputum, Cough Preliminary result        Discharge Details        Discharge Medications      New Medications      Instructions Start Date   albuterol sulfate  (90 Base) MCG/ACT inhaler  Commonly known as:  Ventolin HFA   2 puffs, Inhalation, Every 4 Hours PRN      guaiFENesin 600 MG 12 hr tablet  Commonly known as:  MUCINEX   600 mg, Oral, 2 Times Daily PRN, Can obtain over the counter         Continue These Medications      Instructions Start Date   aspirin 81 MG EC tablet   81 mg, Oral, Daily      atorvastatin 80 MG tablet  Commonly known as:  LIPITOR   80 mg, Oral, Nightly      CLARITIN PO   10 mg, Oral, Daily PRN      clopidogrel 75 MG tablet  Commonly known as:  PLAVIX   75 mg, Oral, Daily      HYDROcodone-acetaminophen 7.5-325 MG per tablet  Commonly known as:  NORCO   1 tablet, Oral, Every 6 Hours PRN      latanoprost 0.005 % ophthalmic solution  Commonly known as:  XALATAN   1 drop, Both Eyes, Nightly         Stop These Medications    metoprolol tartrate 25 MG tablet  Commonly known as:  LOPRESSOR            No Known Allergies      Discharge Disposition:  Home or Self Care    Diet:  Hospital:  Diet Order   Procedures   • Diet Regular       Activity:  Activity Instructions     Activity as Tolerated              CODE STATUS:    Code Status and Medical Interventions:   Ordered at: 05/16/20 0344     Level Of Support Discussed With:    Patient     Code Status:    CPR     Medical Interventions (Level of Support Prior to Arrest):    Full       Future Appointments   Date Time Provider Department Center   5/21/2020 11:15 AM Jasmin Avila APRN [facility]   6/4/2020  9:30 AM Annmarie Sue  [facility] None       Additional Instructions for the Follow-ups that You Need to Schedule     Discharge Follow-up with PCP   As directed       Currently Documented PCP:    Vaishali Massey APRN    PCP Phone Number:    [phone]     Follow Up Details:  1 week               Time Spent on Discharge:  35 minutes    Electronically signed by Wendy Martinez MD, 05/18/20, 12:00          Electronically signed by Wendy Martinez MD at 05/18/20 1200

## 2020-05-18 NOTE — DISCHARGE SUMMARY
[facility] Medicine Services  DISCHARGE SUMMARY    Patient Name: Garrett Mcwilliams  : 1950  MRN: [id number]    Date of Admission: 2020  8:09 PM  Date of Discharge:  20  Primary Care Physician: Vaishali Massey APRN    Consults     Date and Time Order Name Status Description    2020 0840 Inpatient Cardiology Consult Completed     2020 0044 Inpatient Cardiothoracic Surgery Consult      2020 1430 Inpatient Consult to Cardiology Completed     2020 1227 Inpatient Cardiothoracic Surgery Consult Completed           Hospital Course     Presenting Problem:   Shortness of breath [R06.02]    Active Hospital Problems    Diagnosis  POA   • Hyponatremia [E87.1]  Yes   • Anemia [D64.9]  Yes   • Leukocytosis [D72.829]  Yes   • Squamous cell carcinoma lung, left status post XRT [C34.92]  Yes   • Stage IV COPD, with FEV1 of 38% [J43.9]  Yes   • Coronary artery disease involving native coronary artery of native heart status post CABG x4 2020 [I25.10]  Yes      Resolved Hospital Problems    Diagnosis Date Resolved POA   • **Shortness of breath [R06.02] 2020 Yes   • Recurrent syncope [R55] 2020 Yes          Hospital Course:  Garrett Mcwilliams is a 69 y.o. male with hx of CAD s/p CABG on 20, COPD, and lung cancer who presents to the ER due to multiple episodes of presyncope and syncope. These occur after coughing spells. Cardiology was consulted, agree that these episodes are likely due to post-tussive vasovagal response. He also has low blood pressure during these episodes. Cardiology discontinued his Metoprolol and recommends continuing off of it for now. Echo during previous admission  showed EF 60%, diastolic dysfunction. MRI brain was also obtained due to hx of lung cancer and recurrent syncope, and this was unremarkable. CT Surgery gave a dose of IV Lasix today for some small pleural effusions seen on imaging (CTA no evidence of PE). He is stable today and  ready for discharge home. Has appointment with his oncologist tomorrow to continue treatment for his lung cancer.     Of note, patient had a questionable episode of paroxysmal Afib 5/16. Cardiology has followed, no further workup or treatment at this time.      Discharge Follow Up Recommendations for outpatient labs/diagnostics:  F/U with PCP in 1 week    Day of Discharge     HPI:   Patient seen this morning. Feels much better.    Review of Systems  Gen-no fevers, no chills  CV-no chest pain, no palpitations  Resp-no cough, no dyspnea  GI-no N/V/D, no abd pain    All other systems reviewed and negative except any additional pertinent positives and negatives as discussed in HPI.      Vital Signs:   Temp:  [97.5 °F (36.4 °C)-98.2 °F (36.8 °C)] 98.2 °F (36.8 °C)  Heart Rate:  [] 96  Resp:  [16-20] 18  BP: (109-127)/(69-80) 115/75     Physical Exam:  Gen-no acute distress  HENT-NCAT, mucous membranes moist  CV-RRR, S1 S2 normal, no m/r/g  Resp-diminished bilaterally, no wheezes or rales  Abd-soft, NT, ND, +BS  Ext-no edema  Neuro-A&Ox3, no focal deficits  Skin-no rashes, well healing midsternal incision  Psych-appropriate mood      Pertinent  and/or Most Recent Results     Results from last 7 days   Lab Units 05/17/20  0644 05/16/20 2016 05/13/20  0412 05/12/20  0342   WBC 10*3/mm3 10.07 11.47* 9.30  --    HEMOGLOBIN g/dL 10.8* 10.8* 10.1* 9.5*   HEMATOCRIT % 36.9* 33.4* 31.9* 29.8*   PLATELETS 10*3/mm3 357 427 286  --    SODIUM mmol/L 132* 129* 132* 133*   POTASSIUM mmol/L 4.7 4.1 4.3 4.2   CHLORIDE mmol/L 99 92* 94* 94*   CO2 mmol/L 20.0* 23.0 28.0 28.0   BUN mg/dL 11 13 16 14   CREATININE mg/dL 0.69* 0.82 0.72* 0.74*   GLUCOSE mg/dL 124* 119* 121* 125*   CALCIUM mg/dL 8.9 9.0 9.0 9.0     Results from last 7 days   Lab Units 05/16/20 2016   BILIRUBIN mg/dL 0.6   ALK PHOS U/L 55   ALT (SGPT) U/L 41   AST (SGOT) U/L 25           Invalid input(s): TG, LDLCALC, LDLREALC  Results from last 7 days   Lab Units  05/17/20  0644 05/16/20 2212 05/16/20 2016   CORTISOL mcg/dL 6.98  --   --    PROBNP pg/mL  --   --  2,325.0*   TROPONIN T ng/mL 0.076* 0.115* 0.133*   PROCALCITONIN ng/mL  --  0.12  --        Brief Urine Lab Results  (Last result in the past 365 days)      Color   Clarity   Blood   Leuk Est   Nitrite   Protein   CREAT   Urine HCG        05/17/20 0112             30.9             Microbiology Results Abnormal     Procedure Component Value - Date/Time    Respiratory Culture - Sputum, Cough [[id number]] Collected:  05/17/20 0615    Lab Status:  Preliminary result Specimen:  Sputum from Cough Updated:  05/18/20 0919     Respiratory Culture Light growth (2+) Normal Respiratory Cece     Gram Stain Rare (1+) Epithelial cells per low power field      Rare (1+) WBCs per low power field      Few (2+) Mixed bacterial morphotypes seen on Gram Stain    Eosinophil Smear - Urine, Urine, Clean Catch [[id number]]  (Normal) Collected:  05/17/20 0111    Lab Status:  Final result Specimen:  Urine, Clean Catch Updated:  05/17/20 0451     Eosinophil Smear 0 % EOS/100 Cells     Narrative:       No eosinophil seen    Respiratory Panel, PCR - Swab, Nasopharynx [[id number]]  (Normal) Collected:  05/17/20 0117    Lab Status:  Final result Specimen:  Swab from Nasopharynx Updated:  05/17/20 0403     ADENOVIRUS, PCR Not Detected     Coronavirus 229E Not Detected     Coronavirus HKU1 Not Detected     Coronavirus NL63 Not Detected     Coronavirus OC43 Not Detected     Human Metapneumovirus Not Detected     Human Rhinovirus/Enterovirus Not Detected     Influenza B PCR Not Detected     Parainfluenza Virus 1 Not Detected     Parainfluenza Virus 2 Not Detected     Parainfluenza Virus 3 Not Detected     Parainfluenza Virus 4 Not Detected     Bordetella pertussis pcr Not Detected     Influenza A H1 2009 PCR Not Detected     Chlamydophila pneumoniae PCR Not Detected     Mycoplasma pneumo by PCR Not Detected     Influenza A PCR Not Detected      Influenza A H3 Not Detected     Influenza A H1 Not Detected     RSV, PCR Not Detected     Bordetella parapertussis PCR Not Detected    Narrative:       The coronavirus on the RVP is NOT COVID-19 and is NOT indicative of infection with COVID-19.           Imaging Results (All)     Procedure Component Value Units Date/Time    MRI Brain With & Without Contrast [[id number]] Collected:  05/18/20 0841     Updated:  05/18/20 0852    Narrative:       EXAMINATION: MRI BRAIN W WO CONTRAST-      INDICATION: seizure, known lung cancer; R05-Cough; R06.02-Shortness of  breath; Z86.79-Personal history of other diseases of the circulatory  system; Z95.1-Presence of aortocoronary bypass graft; Z87.09-Personal  history of other diseases of the respiratory system     TECHNIQUE: Sagittal and axial T1 and axial T2 FLAIR diffusion-weighted  images the brain, coronal T2-weighted images, post contrast axial  sagittal coronal T1-weighted images, and thin section axial [employer]  therapy T1-weighted images.     COMPARISON: NONE     FINDINGS: Patient history indicates squamous cell carcinoma of the lung.     No evidence of restricted diffusion is seen to suggest acute infarction.  Remaining imaging sequences show moderately advanced generalized  cerebral atrophy, and associated enlargement of the lateral ventricles,  with relative sparing of the temporal horns. There is minimal central  white matter change, not unusual for age. Unenhanced images show no  evidence of mass or mass effect, no evidence of edema, no signal changes  suggestive of hemorrhage,, or abnormal extra-axial collection.     Post contrast images show no evidence of pathologic brain or meningeal  enhancement.          Impression:       No evidence of intracranial metastatic disease or other  acute disease.             CT Angiogram Chest With & Without Contrast [[id number]] Collected:  05/16/20 2356     Updated:  05/16/20 2358    Narrative:       CT ANGIOGRAM CHEST W WO  CONTRAST    INDICATION:   History of left-sided lung cancer. Coronary artery disease. Shortness of air and cough today. Syncope.    TECHNIQUE:   CT angiogram of the chest with IV contrast. 3-D reconstructions were obtained and reviewed.   Radiation dose reduction techniques included automated exposure control or exposure modulation based on body size. Count of known CT and cardiac nuc med studies  performed in previous 12 months: 0.     COMPARISON:   None available.    FINDINGS:   No pulmonary embolism or aortic dissection is identified. There is atherosclerotic disease and coronary artery disease. Patient is status post CABG. There is a small pericardial effusion. There is a trace amount of left pleural fluid. There is a trace  amount of right pleural fluid with adjacent atelectasis in the right lower lobe. There is COPD. Right lung is otherwise clear. Volume loss is noted in the left hemithorax with extensive fibrosis in the left upper lung, presumably related to prior  radiation therapy. Correlate with history. Bandlike areas of scarring or atelectasis are present at the left lung base. No CT findings present to indicate pneumonia. Note: CT may be negative in the earliest stages of COVID-19. No pneumothorax is  identified. There are multiple gas bubbles in the epicardial fat near the cardiac apex on the left. This is likely related to recent CABG. No suspicious osseous lesions are identified. The tip of the patient's right-sided Port-A-Cath appears to terminate  in the right internal jugular vein. Upper abdomen shows colonic diverticulosis.      Impression:         1. No PE or aortic dissection.  2. Chronic fibrosis and volume loss in the left upper lung is presumably related to radiation therapy.  3. Recent CABG with a small pericardial effusion. There are also trace bilateral pleural effusions with some atelectasis in the lower lobes.  4. COPD. No CT findings present to indicate pneumonia. Note: CT may be  negative in the earliest stages of COVID-19.    Signer Name: Rony Nicole MD   Signed: 5/16/2020 11:56 PM   Workstation Name: [id number]    [facility]    XR Chest 1 View [[id number]] Collected:  05/16/20 2057     Updated:  05/16/20 2059    Narrative:       CR Chest 1 Vw    INDICATION:   69-year-old male with shortness of air and cough today. Multiple syncopal episodes today.     COMPARISON:    Chest 5/13/2020    FINDINGS:  Single portable AP view(s) of the chest.  Chronic left-sided volume loss and extensive left apical pulmonary scarring. Right lung clear. No pneumothorax area no large pleural effusions. Heart size and mediastinum appear stable. Median sternotomy wires.  Right-sided Port-A-Cath again noted with catheter extending cephalad in the right internal jugular vein.      Impression:         1. No acute chest findings. Stable left-sided volume loss and extensive left apical pulmonary scarring.  2. Right-sided Port-A-Cath again noted with catheter extending cephalad in the right internal jugular vein.    Signer Name: Leandro Messina MD   Signed: 5/16/2020 8:57 PM   Workstation Name: [id number]    [facility]          Results for orders placed during the hospital encounter of 05/07/20   Carotid Duplex - Bilateral    Narrative · Proximal right internal carotid artery plaque without significant   stenosis.  · Right internal carotid artery stenosis of 0-49%.  · Proximal left internal carotid artery plaque without significant   stenosis.  · Left internal carotid artery stenosis of 0-49%.  · Nominal antegrade bilateral vertebral artery flow demonstrated.          Results for orders placed during the hospital encounter of 05/07/20   Carotid Duplex - Bilateral    Narrative · Proximal right internal carotid artery plaque without significant   stenosis.  · Right internal carotid artery stenosis of 0-49%.  · Proximal left internal carotid artery plaque without  significant   stenosis.  · Left internal carotid artery stenosis of 0-49%.  · Nominal antegrade bilateral vertebral artery flow demonstrated.          Results for orders placed during the hospital encounter of 05/07/20   Adult Transthoracic Echocardiogram Complete    Narrative · The following left ventricular wall segments are hypokinetic: apical   anterior and apex hypokinetic.  · Estimated EF = 60%.  · Left ventricular systolic function is normal.  · Left ventricular diastolic dysfunction (grade I) consistent with   impaired relaxation.  · Mild tricuspid valve regurgitation is present.  · There is no evidence of a left ventricular mass or thrombus present.  · Normal right ventricular cavity size, wall thickness, systolic function   and septal motion noted.  · The aortic valve exhibits mild sclerosis.  · No evidence of pulmonary hypertension is present.  · There is no evidence of pericardial effusion.           Order Current Status    Respiratory Culture - Sputum, Cough Preliminary result        Discharge Details        Discharge Medications      New Medications      Instructions Start Date   albuterol sulfate  (90 Base) MCG/ACT inhaler  Commonly known as:  Ventolin HFA   2 puffs, Inhalation, Every 4 Hours PRN      guaiFENesin 600 MG 12 hr tablet  Commonly known as:  MUCINEX   600 mg, Oral, 2 Times Daily PRN, Can obtain over the counter         Continue These Medications      Instructions Start Date   aspirin 81 MG EC tablet   81 mg, Oral, Daily      atorvastatin 80 MG tablet  Commonly known as:  LIPITOR   80 mg, Oral, Nightly      CLARITIN PO   10 mg, Oral, Daily PRN      clopidogrel 75 MG tablet  Commonly known as:  PLAVIX   75 mg, Oral, Daily      HYDROcodone-acetaminophen 7.5-325 MG per tablet  Commonly known as:  NORCO   1 tablet, Oral, Every 6 Hours PRN      latanoprost 0.005 % ophthalmic solution  Commonly known as:  XALATAN   1 drop, Both Eyes, Nightly         Stop These Medications    metoprolol  tartrate 25 MG tablet  Commonly known as:  LOPRESSOR            No Known Allergies      Discharge Disposition:  Home or Self Care    Diet:  Hospital:  Diet Order   Procedures   • Diet Regular       Activity:  Activity Instructions     Activity as Tolerated              CODE STATUS:    Code Status and Medical Interventions:   Ordered at: 05/16/20 9158     Level Of Support Discussed With:    Patient     Code Status:    CPR     Medical Interventions (Level of Support Prior to Arrest):    Full       Future Appointments   Date Time Provider Department Center   5/21/2020 11:15 AM Jasmin Avila APRN [facility]   6/4/2020  9:30 AM Annmarie Sue APRN [facility] None       Additional Instructions for the Follow-ups that You Need to Schedule     Discharge Follow-up with PCP   As directed       Currently Documented PCP:    Vaishali Massey APRN    PCP Phone Number:    [phone]     Follow Up Details:  1 week               Time Spent on Discharge:  35 minutes    Electronically signed by Wendy Martinez MD, 05/18/20, 12:00

## 2020-05-18 NOTE — PLAN OF CARE
Problem: Patient Care Overview  Goal: Plan of Care Review  Outcome: Ongoing (interventions implemented as appropriate)  Flowsheets  Taken 5/18/2020 0203  Progress: no change  Outcome Summary: Pt has been resting well this shift. Did have no coughing fit and coughed up a lot of mucous. Pt has no complaints at this time. Will continue to monitor. 0200 5/18/2020  Taken 5/17/2020 2000  Plan of Care Reviewed With: patient

## 2020-05-18 NOTE — PROGRESS NOTES
CTS Progress Note       LOS: 0 days   Patient Care Team:  Vaishali Massey APRN as PCP - General (Nurse Practitioner)    Chief Complaint: Shortness of breath    Vital Signs:  Temp:  [97.5 °F (36.4 °C)-98 °F (36.7 °C)] 98 °F (36.7 °C)  Heart Rate:  [86-96] 90  Resp:  [16-20] 20  BP: (106-127)/(64-86) 125/79    Physical Exam: Alert conversant sternum remained stable breathing unlabored       Results:   Results from last 7 days   Lab Units 05/17/20  0644   WBC 10*3/mm3 10.07   HEMOGLOBIN g/dL 10.8*   HEMATOCRIT % 36.9*   PLATELETS 10*3/mm3 357     Results from last 7 days   Lab Units 05/17/20  0644   SODIUM mmol/L 132*   POTASSIUM mmol/L 4.7   CHLORIDE mmol/L 99   CO2 mmol/L 20.0*   BUN mg/dL 11   CREATININE mg/dL 0.69*   GLUCOSE mg/dL 124*   CALCIUM mg/dL 8.9           Imaging Results (Last 24 Hours)     Procedure Component Value Units Date/Time    MRI Brain With & Without Contrast [[id number]] Resulted:  05/17/20 2024     Updated:  05/17/20 2044          Assessment      Shortness of breath    Coronary artery disease involving native coronary artery of native heart status post CABG x4 5/8/2020    Squamous cell carcinoma lung, left status post XRT    Stage IV COPD, with FEV1 of 38%    Hyponatremia    Anemia    Leukocytosis    Recurrent syncope    Patient is status post recent four-vessel coronary bypass grafting surgery on May 8 of this year.  He had developed paroxysms of coughing after his discharge.  He described syncopal events during his coughing episodes.  CT scan of the chest was unremarkable aside from his known left-sided radiation treatments.  MRI of the brain result is pending.  However patient is on room air with a saturation of 98% he is in a sinus rhythm with a blood pressure in the 120s and he says he has not coughed since yesterday afternoon.  At this point I have nothing further to add from a surgical standpoint.    Plan   Lasix 60 mg IV x1.    Please note that portions of this note were completed with  a voice recognition program. Efforts were made to edit the dictations, but occasionally words are mistranscribed.    Rony Pizarro MD  05/18/20  07:09

## 2020-05-18 NOTE — CONSULTS
Referring Provider: MD Juan  Reason for Consultation: AOCRF    Subjective .   Education:  NN spoke with pt at BS.  Pt alert and able to answer questions appropriately.  Pt O2 sat  98 % on RA currently, home O2 use RA.  Pt states use of rescue inhaler 3times ever, relief of SOB within 2-3 Mins.  Former smoker, quit date 1990.  Not up to date on flu and PNA vaccines.  Pt reports the ability to ambulate  Unhindered at baseline.  He reports being able to navigate what sounds like a good distance around his home, but states that he thought he should be able to go further.  He re[ports no issues with medications or transportation at this time.  Pt reports that he has not been told that he has COPD.  Pt reports no previous hx of formal COPD teaching, no understanding of action plan, or KS.  Stop light report, NN contact information, instructions for accessing iTGR and list of educational videos given to pt.  COPD education completed in the form of explanation, handouts, and videos.  COPD Taking Control started at BS.  No new concerns or questions voiced at this time.  NN will continue to follow as needed.    Age: 69 y.o.  Sex: male  Smoker Status: former, 105 pack years  Pulmonologist: Les  FEV1 (PFT): 38% (5/8/2020)  Home O2: RA    Objective     SpO2 SpO2: 98 % (05/18/20 1219)  Device Device (Oxygen Therapy): room air (05/18/20 1219)  Flow    Incentive Spirometer $ Incentive Spirometry: yes (05/17/20 1600)  IS Predicted Level (mL)     Number of Repetitions     Level Incentive Spirometer (mL) Level Incentive Spirometer (mL): 1000 (05/17/20 1600)  Patient Tolerance Patient Tolerance (IS): fair (05/17/20 1600)   Inhaler Treatment Status    Treatment Route        Home Medications:  Medications Prior to Admission   Medication Sig Dispense Refill Last Dose   • aspirin 81 MG EC tablet Take 1 tablet by mouth Daily. 30 tablet 2    • atorvastatin (LIPITOR) 80 MG tablet Take 1 tablet by mouth Every Night. 30 tablet 3    •  clopidogrel (PLAVIX) 75 MG tablet Take 1 tablet by mouth Daily. 30 tablet 3    • HYDROcodone-acetaminophen (NORCO) 7.5-325 MG per tablet Take 1 tablet by mouth Every 6 (Six) Hours As Needed for Moderate Pain  for up to 5 days. 30 tablet 0    • latanoprost (XALATAN) 0.005 % ophthalmic solution Administer 1 drop to both eyes Every Night.      • Loratadine (CLARITIN PO) Take 10 mg by mouth Daily As Needed (allergies).   Past Week at Unknown time   • metoprolol tartrate (LOPRESSOR) 25 MG tablet Take 0.5 tablets by mouth Every 12 (Twelve) Hours. 30 tablet 3        Discussion: Per current GOLD Standards, please consider: No LAMA/LABA/ICS in place, Outpatient PFT      Discussed with primary RN    Solange Bee RN

## 2020-05-19 LAB
BACTERIA SPEC RESP CULT: NORMAL
GRAM STN SPEC: NORMAL

## 2020-05-20 ENCOUNTER — READMISSION MANAGEMENT (OUTPATIENT)
Dept: CALL CENTER | Facility: HOSPITAL | Age: 70
End: 2020-05-20

## 2020-05-20 ENCOUNTER — TELEPHONE (OUTPATIENT)
Dept: CARDIOLOGY | Facility: HOSPITAL | Age: 70
End: 2020-05-20

## 2020-05-20 NOTE — TELEPHONE ENCOUNTER
Patient states this morning his swelling is better. Feet is swollen. Patient went to ER and had IV diuresis of Lasix.

## 2020-05-20 NOTE — TELEPHONE ENCOUNTER
Patient's wife called and states that glucose was high this morning and patient has been having leg edema and feet edema. Will call to gather more information.

## 2020-05-20 NOTE — OUTREACH NOTE
Medical Week 1 Survey      Responses   [facility] patient discharged from?  [place]   COVID-19 Test Status  Negative   Does the patient have one of the following disease processes/diagnoses(primary or secondary)?  Other   Is there a successful TCM telephone encounter documented?  No   Week 1 attempt successful?  Yes   Call start time  1254   Call end time  1304   List who call center can speak with  wife   Person spoke with today (if not patient) and relationship  wife   Meds reviewed with patient/caregiver?  Yes   Is the patient having any side effects they believe may be caused by any medication additions or changes?  No   Does the patient have all medications ordered at discharge?  Yes   Is the patient taking all medications as directed (includes completed medication regime)?  Yes   Comments regarding appointments  Pt has video conference coming up. [facility]   Does the patient have a primary care provider?   Yes   Does the patient have an appointment with their PCP within 7 days of discharge?  Yes   Comments regarding PCP  Vaishali Massey to be seen 5/29   What is the Home health agency?   [facility]   Has home health visited the patient within 72 hours of discharge?  Yes   Pulse Ox monitoring  None   Psychosocial issues?  No   Comments  HH comes and still having feet swelling, not taking diuretics , bs running 135 notdiabetic   Did the patient receive a copy of their discharge instructions?  Yes   Nursing interventions  Reviewed instructions with patient, Educated on MyChart   What is the patient's perception of their health status since discharge?  Improving   Is the patient/caregiver able to teach back signs and symptoms related to disease process for when to call PCP?  Yes   Is the patient/caregiver able to teach back signs and symptoms related to disease process for when to call 911?  Yes   Is the patient/caregiver able to teach back the hierarchy of who to call/visit for  symptoms/problems? PCP, Specialist, Home health nurse, Urgent Care, ED, 911  Yes   Week 1 call completed?  Yes   Wrap up additional comments  No more syncope episodes, has COPD and lung cancer is sob all time, no oxygen sats good, some swelling in feet. feeling fine, wife just worried, has Video visit, with heart/lung tomorrow          Ashlee Carpenter, RN

## 2020-05-21 ENCOUNTER — TELEMEDICINE (OUTPATIENT)
Dept: CARDIOLOGY | Facility: HOSPITAL | Age: 70
End: 2020-05-21

## 2020-05-21 VITALS
WEIGHT: 149 LBS | HEART RATE: 94 BPM | BODY MASS INDEX: 24.83 KG/M2 | SYSTOLIC BLOOD PRESSURE: 108 MMHG | HEIGHT: 65 IN | DIASTOLIC BLOOD PRESSURE: 66 MMHG

## 2020-05-21 DIAGNOSIS — J43.9 PULMONARY EMPHYSEMA, UNSPECIFIED EMPHYSEMA TYPE (HCC): ICD-10-CM

## 2020-05-21 DIAGNOSIS — I50.33 ACUTE ON CHRONIC DIASTOLIC CONGESTIVE HEART FAILURE (HCC): Primary | ICD-10-CM

## 2020-05-21 DIAGNOSIS — R55 SYNCOPE AND COLLAPSE: ICD-10-CM

## 2020-05-21 DIAGNOSIS — I25.10 CORONARY ARTERY DISEASE INVOLVING NATIVE CORONARY ARTERY OF NATIVE HEART WITHOUT ANGINA PECTORIS: ICD-10-CM

## 2020-05-21 PROCEDURE — 99213 OFFICE O/P EST LOW 20 MIN: CPT | Performed by: NURSE PRACTITIONER

## 2020-05-21 NOTE — PROGRESS NOTES
[facility]  Heart and Valve Center  Telemedicine note    05/21/2020     Garrett MOONEY [facility] [place] [address]    1950    Vaishali Massey APRN    Garrett Mcwilliams is a 69 y.o. male.      Subjective:     Chief Complaint:  Post-op Open Heart Surgery and Establish Care     This was an video enabled telemedicine encounter. Unable to complete visit using a video connection to the patient. A phone visit was used to complete this visits. Total time of discussion was 20 minutes.     HPI :  Mr. Mcwilliams recently underwent CABG per Dr. Pizarro on 5/8/2020. Prior to that, he had undergone XRT/ chemotherapy for left lung cancer.  He recovered well without any acute complications and was DC home 5/13/2020.  However, he presented to [facility] ER on 5/16/2020 due to syncope related to coughing.  He was readmitted, diuresed with IV lasix, beta blocker DC'd, and observed.  Stable for DC home 5/18/20.    Today, patient states no further syncopal episodes or much in the way of coughing.  No leg swelling.  No acute incisional pain.  He states he is feeling a bit stronger each day.  Spouse was able to provide daily weight measurements, BP, and pulse numbers.  She reports no hypotension, bradycardia, or weight gain.        Patient Active Problem List   Diagnosis   • Coronary artery disease involving native coronary artery of native heart status post CABG x4 5/8/2020   • Squamous cell carcinoma lung, left status post XRT   • Squamous cell carcinoma of skin of cheek   • Osteoarthritis   • Stage IV COPD, with FEV1 of 38%   • Remote tobacco use   • Hyponatremia   • Anemia   • Leukocytosis   • Syncope and collapse   • Acute on chronic diastolic congestive heart failure (CMS/HCC)       Past Medical History:   Diagnosis Date   • Arthritis    • CAD (coronary artery disease)    • Cancer (CMS/HCC)    • COPD (chronic obstructive pulmonary disease) (CMS/HCC)    • MI (myocardial infarction) (CMS/HCC)    • SCC (squamous cell  carcinoma of lung), left (CMS/HCC)    • Squamous cell carcinoma in situ (SCCIS) of skin of cheek        Past Surgical History:   Procedure Laterality Date   • CORONARY ARTERY BYPASS GRAFT N/A 2020    Procedure: MEDIAN STERNOTOMY, CORONARY ARTERY BYPASS GRAFT  X4 , ENDOSCOPIC VEIN HARVESTING OF LEFT GREATER SAPHENOUS VEIN , EVH EXPLORATION OF RIGH TLEG;  Surgeon: Rony Pizarro MD;  Location: [facility] OR;  Service: Cardiothoracic;  Laterality: N/A;  vo 1200  vr 1215   • PORTACATH PLACEMENT         Family History   Problem Relation Age of Onset   • Aneurysm Father    • Ulcers Mother    • Aneurysm Mother    • Cancer Sister    • Heart failure Sister    • Aneurysm Brother        Social History     Socioeconomic History   • Marital status:      Spouse name: Not on file   • Number of children: Not on file   • Years of education: Not on file   • Highest education level: Not on file   Tobacco Use   • Smoking status: Former Smoker     Packs/day: 3.00     Years: 35.00     Pack years: 105.00     Types: Cigarettes     Last attempt to quit: 1990     Years since quittin.0   • Smokeless tobacco: Never Used   Substance and Sexual Activity   • Alcohol use: Never     Frequency: Never   • Drug use: Never   Social History Narrative    Caffeine: 1 cup coffee daily       No Known Allergies      Current Outpatient Medications:   •  albuterol sulfate HFA (Ventolin HFA) 108 (90 Base) MCG/ACT inhaler, Inhale 2 puffs Every 4 (Four) Hours As Needed for Wheezing., Disp: 18 g, Rfl: 0  •  aspirin 81 MG EC tablet, Take 1 tablet by mouth Daily., Disp: 30 tablet, Rfl: 2  •  atorvastatin (LIPITOR) 80 MG tablet, Take 1 tablet by mouth Every Night., Disp: 30 tablet, Rfl: 3  •  clopidogrel (PLAVIX) 75 MG tablet, Take 1 tablet by mouth Daily., Disp: 30 tablet, Rfl: 3  •  guaiFENesin (MUCINEX) 600 MG 12 hr tablet, Take 1 tablet by mouth 2 (Two) Times a Day As Needed for Cough. Can obtain over the counter, Disp: , Rfl:   •   "latanoprost (XALATAN) 0.005 % ophthalmic solution, Administer 1 drop to both eyes Every Night., Disp: , Rfl:   •  Loratadine (CLARITIN PO), Take 10 mg by mouth Daily As Needed (allergies)., Disp: , Rfl:     The following portions of the patient's history were reviewed today and updated as appropriate: allergies, current medications, past family history, past medical history, past social history, past surgical history and problem list     Review of Systems   Constitution: Positive for malaise/fatigue.   HENT: Negative.    Cardiovascular: Positive for dyspnea on exertion. Negative for chest pain, irregular heartbeat, leg swelling and near-syncope.        MATTHEWS improving since DC home   Respiratory: Positive for shortness of breath. Negative for cough.    Endocrine: Negative.    Hematologic/Lymphatic: Does not bruise/bleed easily.   Skin: Negative.    Musculoskeletal: Negative.    Gastrointestinal: Negative for constipation and diarrhea.   Genitourinary: Negative.    Neurological: Negative for dizziness.   Psychiatric/Behavioral: The patient has insomnia.         Sleeping poorly since DC home   Allergic/Immunologic: Negative.        Objective:     Vitals:    05/21/20 1054   BP: 108/66   BP Location: Left arm   Patient Position: Sitting   Cuff Size: Adult   Pulse: 94   Weight: 67.6 kg (149 lb)   Height: 165.1 cm (65\")       Body mass index is 24.79 kg/m².    Physical Exam   Constitutional: He is oriented to person, place, and time. No distress.   Pulmonary/Chest: Effort normal.   Musculoskeletal: He exhibits no edema.   Reports no edema   Neurological: He is alert and oriented to person, place, and time. No cranial nerve deficit.   Psychiatric: He has a normal mood and affect. His behavior is normal. Judgment normal.       Lab and Diagnostic Review:    Assessment and Plan:   1. Syncope and collapse  - Resolved.    - BB has been discontinued and MRI brain was unremarkable    2. Acute on chronic diastolic congestive heart " failure (CMS/[facility])  - Diuresed inpatient with lasix  - No daily diuretic in use  - Reviewed with spouse to notify Cardiology for weight gain 3 lbs overnight or 5 lbs in a week  - Spouse states he is scheduled with Cardiology in [place] for follow up     3. Coronary artery disease s/p CABG x 4 per Dr. Pizarro 5/8/20  - ASA, plavix, statin  - Continue IS, mobilization, reviewed sternal precautions, and follow up visit scheduled with CT Surgery 6/4/20    4. Pulmonary emphysema, unspecified emphysema type (CMS/[facility])  - Following with Dr. Perez in [place]      This visit has been scheduled as a video visit to comply with patient safety concerns in accordance with CDC recommendations. Total time of discussion was 20 minutes.      It has been a pleasure to participate in the care of this patient.  Patient was instructed to call the Heart and Valve Center with any questions, concerns, or worsening symptoms.

## 2020-05-27 ENCOUNTER — READMISSION MANAGEMENT (OUTPATIENT)
Dept: CALL CENTER | Facility: HOSPITAL | Age: 70
End: 2020-05-27

## 2020-05-27 NOTE — OUTREACH NOTE
Medical Week 2 Survey      Responses   [facility] patient discharged from?  [place]   COVID-19 Test Status  Negative   Does the patient have one of the following disease processes/diagnoses(primary or secondary)?  Other   Week 2 attempt successful?  No   Unsuccessful attempts  Attempt 1          Lien Easley RN

## 2020-05-29 ENCOUNTER — READMISSION MANAGEMENT (OUTPATIENT)
Dept: CALL CENTER | Facility: HOSPITAL | Age: 70
End: 2020-05-29

## 2020-05-29 NOTE — OUTREACH NOTE
Medical Week 2 Survey      Responses   [facility] patient discharged from?  [place]   COVID-19 Test Status  Negative   Does the patient have one of the following disease processes/diagnoses(primary or secondary)?  Other   Week 2 attempt successful?  Yes   Call start time  1055   Discharge diagnosis  syncope d/t vasovagal response (coughing), had CABG on 5/8   Call end time  1104   Person spoke with today (if not patient) and relationship  wife   Meds reviewed with patient/caregiver?  Yes   Is the patient taking all medications as directed (includes completed medication regime)?  Yes   Has the patient kept scheduled appointments due by today?  Yes   What is the Home health agency?   [facility]   Pulse Ox monitoring  None   Comments  Pt reports still feeling weakness. BP still running lower than normal, 90's/60's. Denies SOA or feeling syncope. LE edema still present.    What is the patient's perception of their health status since discharge?  Same   Is the patient/caregiver able to teach back signs and symptoms related to disease process for when to call PCP?  Yes   Is the patient/caregiver able to teach back signs and symptoms related to disease process for when to call 911?  Yes   Week 2 Call Completed?  Yes          Radha Dean RN

## 2020-06-03 ENCOUNTER — READMISSION MANAGEMENT (OUTPATIENT)
Dept: CALL CENTER | Facility: HOSPITAL | Age: 70
End: 2020-06-03

## 2020-06-03 NOTE — OUTREACH NOTE
Medical Week 3 Survey      Responses   [facility] patient discharged from?  [place]   COVID-19 Test Status  Negative   Does the patient have one of the following disease processes/diagnoses(primary or secondary)?  Other   Week 3 attempt successful?  Yes   Call start time  1109   Call end time  1121   Discharge diagnosis  syncope d/t vasovagal response (coughing), had CABG on 5/8   Person spoke with today (if not patient) and relationship  wife, Kristy   Meds reviewed with patient/caregiver?  Yes   Is the patient taking all medications as directed (includes completed medication regime)?  Yes   Medication comments  Antibiotic started for laceration to orbit, was seen at the ER yesterday   Has the patient kept scheduled appointments due by today?  Yes   Comments  Pulmonologist appt on Friday 06/05/2020   What is the Home health agency?   [facility]    Home health comments  Therapist there today    Pulse Ox monitoring  None   What is the patient's perception of their health status since discharge?  Worsening [Wife says he had a seizure yesterday struck his head, was taken to [facility]. He had an orbital floor fracture and required stitches. She is very concerned she says he has seizures, he coughs and cuts off his breathing and he has a seizure]   Week 3 Call Completed?  Yes          Faustina Anderson RN

## 2020-06-04 ENCOUNTER — OFFICE VISIT (OUTPATIENT)
Dept: CARDIAC SURGERY | Facility: CLINIC | Age: 70
End: 2020-06-04

## 2020-06-04 VITALS
WEIGHT: 150 LBS | OXYGEN SATURATION: 99 % | DIASTOLIC BLOOD PRESSURE: 76 MMHG | HEART RATE: 103 BPM | BODY MASS INDEX: 24.99 KG/M2 | SYSTOLIC BLOOD PRESSURE: 99 MMHG | HEIGHT: 65 IN | TEMPERATURE: 99.2 F

## 2020-06-04 DIAGNOSIS — I25.10 CORONARY ARTERY DISEASE INVOLVING NATIVE CORONARY ARTERY OF NATIVE HEART WITHOUT ANGINA PECTORIS: ICD-10-CM

## 2020-06-04 DIAGNOSIS — Z95.1 S/P CABG X 4: Primary | ICD-10-CM

## 2020-06-04 PROCEDURE — 71045 X-RAY EXAM CHEST 1 VIEW: CPT | Performed by: NURSE PRACTITIONER

## 2020-06-04 PROCEDURE — 99024 POSTOP FOLLOW-UP VISIT: CPT | Performed by: NURSE PRACTITIONER

## 2020-06-04 RX ORDER — ERYTHROMYCIN 5 MG/G
OINTMENT OPHTHALMIC
COMMUNITY
Start: 2020-06-02

## 2020-06-04 RX ORDER — CEPHALEXIN 500 MG/1
CAPSULE ORAL
COMMUNITY
Start: 2020-06-02

## 2020-06-04 RX ORDER — IPRATROPIUM BROMIDE AND ALBUTEROL SULFATE 2.5; .5 MG/3ML; MG/3ML
SOLUTION RESPIRATORY (INHALATION)
COMMUNITY
Start: 2020-05-29

## 2020-06-04 NOTE — PROGRESS NOTES
[facility] Cardiothoracic Surgery Follow-Up Note    Name:  Garrett Mcwilliams  MRN Number:  [id number]  Date of Encounter:  06/04/2020    Referred By:  No ref. provider found  PCP:  Vaishali Massey APRN    Chief Complaint:    Chief Complaint   Patient presents with   • Post-op Follow-up     Follow up S/P CABGx4 on 5/8/2020. Pt states that since his surgery he has had recurring episodes of SOA when coughing and has several syncope episodes to seiizure like actiions,Pt was taken to the ER after these episodes. The most recent episode caused a fall leading to a fall in which he was taken back to the ER with head and facial cuts. Left eye is completely swollen shut, a total of 11 stitches all together.        History of Present Illness:    Mr. Garrett Mcwilliams is a pleasant 69 y.o. male with a history of COPD, cancer, syncope and coronary artery disease status post CABG x4 with [facility] 5/8/2020 per Dr. Pizarro who returns the office today for postoperative exam.  The patient denies incisional pain, worsening shortness of breath or difficulty with ambulation.  He has had another syncopal episode in relation to a coughing spell.  He is scheduled to follow-up with Dr. Perez next week, cardiology.  He is also scheduled to see Dr Geovanna Casanova, pulmonology, in the near future as well.      Review of Systems:  Review of Systems   Constitution: Negative for chills, fever and malaise/fatigue.   Eyes: Negative for visual disturbance.   Cardiovascular: Positive for syncope. Negative for chest pain, dyspnea on exertion and leg swelling.   Respiratory: Positive for cough and shortness of breath. Negative for hemoptysis.    Skin: Negative for poor wound healing.   Gastrointestinal: Negative for abdominal pain.   Neurological: Negative for weakness.   Psychiatric/Behavioral: Negative for altered mental status.       Past Medical History:    Past Medical History:   Diagnosis Date   • Arthritis    • CAD (coronary artery disease)    •  Cancer (CMS/[facility])    • COPD (chronic obstructive pulmonary disease) (CMS/HCC)    • MI (myocardial infarction) (CMS/HCC)    • SCC (squamous cell carcinoma of lung), left (CMS/HCC)    • Squamous cell carcinoma in situ (SCCIS) of skin of cheek    • Syncope        Past Surgical History:    Past Surgical History:   Procedure Laterality Date   • CORONARY ARTERY BYPASS GRAFT N/A 2020    Procedure: MEDIAN STERNOTOMY, CORONARY ARTERY BYPASS GRAFT  X4 , ENDOSCOPIC VEIN HARVESTING OF LEFT GREATER SAPHENOUS VEIN , EVH EXPLORATION OF RIGH TLEG;  Surgeon: Rony Pizarro MD;  Location: [facility];  Service: Cardiothoracic;  Laterality: N/A;  vo 1200  vr 1215   • PORTACATH PLACEMENT         Patient Active Problem List   Diagnosis   • Coronary artery disease involving native coronary artery of native heart status post CABG x4 2020   • Squamous cell carcinoma lung, left status post XRT   • Squamous cell carcinoma of skin of cheek   • Osteoarthritis   • Stage IV COPD, with FEV1 of 38%   • Remote tobacco use   • Hyponatremia   • Anemia   • Leukocytosis   • Syncope and collapse   • Acute on chronic diastolic congestive heart failure (CMS/[facility])     Social History     Tobacco Use   • Smoking status: Former Smoker     Packs/day: 3.00     Years: 35.00     Pack years: 105.00     Types: Cigarettes     Last attempt to quit: 1990     Years since quittin.0   • Smokeless tobacco: Never Used   Substance Use Topics   • Alcohol use: Never     Frequency: Never   • Drug use: Never     Family History   Problem Relation Age of Onset   • Aneurysm Father    • Ulcers Mother    • Aneurysm Mother    • Cancer Sister    • Heart failure Sister    • Aneurysm Brother        Medications:      Current Outpatient Medications:   •  albuterol sulfate HFA (Ventolin HFA) 108 (90 Base) MCG/ACT inhaler, Inhale 2 puffs Every 4 (Four) Hours As Needed for Wheezing., Disp: 18 g, Rfl: 0  •  aspirin 81 MG EC tablet, Take 1 tablet by mouth Daily., Disp: 30  "tablet, Rfl: 2  •  atorvastatin (LIPITOR) 80 MG tablet, Take 1 tablet by mouth Every Night., Disp: 30 tablet, Rfl: 3  •  cephalexin (KEFLEX) 500 MG capsule, , Disp: , Rfl:   •  clopidogrel (PLAVIX) 75 MG tablet, Take 1 tablet by mouth Daily., Disp: 30 tablet, Rfl: 3  •  erythromycin (ROMYCIN) 5 MG/GM ophthalmic ointment, , Disp: , Rfl:   •  guaiFENesin (MUCINEX) 600 MG 12 hr tablet, Take 1 tablet by mouth 2 (Two) Times a Day As Needed for Cough. Can obtain over the counter, Disp: , Rfl:   •  ipratropium-albuterol (DUO-NEB) 0.5-2.5 mg/3 ml nebulizer, USE ONE VIAL VIA NEBULIZER EVERY FOUR HOURS AS NEEDED SHORTNESS OF AIR, Disp: , Rfl:   •  latanoprost (XALATAN) 0.005 % ophthalmic solution, Administer 1 drop to both eyes Every Night., Disp: , Rfl:   •  Loratadine (CLARITIN PO), Take 10 mg by mouth Daily As Needed (allergies)., Disp: , Rfl:     Allergies:  No Known Allergies    Physical Exam:  Vital Signs:    Vitals:    06/04/20 0932   BP: 99/76   Pulse: 103   Temp: 99.2 °F (37.3 °C)   TempSrc: Temporal   SpO2: 99%   Weight: 68 kg (150 lb)   Height: 165.1 cm (65\")       Physical Exam   Gen- NAD, pleasant, cooperative  CV- Regular rate and rhythm, no murmur, gallop or rub  Pulm- Clear to auscultation bilateral without rhonchi, scattered wheezes noted throughout left lung field  GI- Soft, normoactive bowel sounds, non-tender  Ext- Without edema,   Incision- Well approximated midsternal, MT sites and EVH site with no erythema, drainage or dehiscence noted  Neuro- CN II- XII grossly intact, tongue midline, voice normal.    Labs/Imaging:  Chest x-ray, Psychiatric, 6/4/2020  Impression:  Stable appearance of the left hemithorax. Port-A-Catheter on the right tip not visualized likely heading upward within the neck as seen on the prior examination.  I personally reviewed these images in the office today.    Assessment / Plan:  Mr. Garrett Mcwilliams is a pleasant 69 y.o. male with a history of COPD, cancer, syncope and " coronary artery disease status post CABG x4 with [facility] 5/8/2020 per Dr. Pizarro who returns to the office today for postoperative exam.  The patient has had a stable postoperative course.  He had a brief readmission for syncopal episodes, however, from a surgical standpoint he is doing well.  He is following up with cardiology and pulmonology next week to try to establish a basis for what is causing these syncopal episodes.  They seem to be in relation to coughing spells with probable stimulation of the vagus nerve.  The patient's incisions are healing well, sternum is stable and chest x-ray obtained in the office today is stable.  I have instructed him that he may begin driving at 6 weeks postop as long as he is not experiencing dizziness or using pain medication, however, I have cautioned him to avoid doing this until he has an etiology for his syncopal episodes.  I have outlined the physical activity suitable for each benchmark between 6 weeks, 3 months in 1 year.  From a surgical standpoint, the patient will return to see us on an as-needed basis.  Medical management will be resumed by his cardiologist, Dr. Perez.  Should he have any questions or concerns, he may contact the office.    Please note, this document was produced using voice recognition software.    OWEN Martin  [facility] Cardiothoracic Surgery

## 2020-06-12 ENCOUNTER — READMISSION MANAGEMENT (OUTPATIENT)
Dept: CALL CENTER | Facility: HOSPITAL | Age: 70
End: 2020-06-12

## 2020-06-12 NOTE — OUTREACH NOTE
Medical Week 4 Survey      Responses   [facility] patient discharged from?  [place]   COVID-19 Test Status  Negative   Does the patient have one of the following disease processes/diagnoses(primary or secondary)?  Other   Week 4 attempt successful?  Yes   Call start time  0836   Call end time  0843   Person spoke with today (if not patient) and relationship  wife, Kristy Rodartes reviewed with patient/caregiver?  Yes   Is the patient taking all medications as directed (includes completed medication regime)?  Yes   Has the patient kept scheduled appointments due by today?  Yes   Is the patient still receiving Home Health Services?  Yes   Home health comments  Therapy is coming today to evaluate if he needs further visits.   Pulse Ox monitoring  None   What is the patient's perception of their health status since discharge?  Improving   Week 4 Call Completed?  Yes   Would the patient like one additional call?  No   Graduated  Yes   Did the patient feel the follow up calls were helpful during their recovery period?  Yes   Was the number of calls appropriate?  Yes   Wrap up additional comments  No further syncope.  His pulmonolgist has him on prednisone taper down doses.  Improving.          Carla Sheppard RN  
Detailed exam

## 2020-06-17 ENCOUNTER — DOCUMENTATION (OUTPATIENT)
Dept: CARDIAC REHAB | Facility: HOSPITAL | Age: 70
End: 2020-06-17

## 2020-06-30 ENCOUNTER — DOCUMENTATION (OUTPATIENT)
Dept: CARDIAC REHAB | Facility: HOSPITAL | Age: 70
End: 2020-06-30

## 2020-06-30 NOTE — PROGRESS NOTES
Cardiac Rehab staff mailed referral letter to patient regarding Phase II Cardiac Rehab program. Instruction for patient to contact [facility]iac [facility] Department for additional program information and to forward referral to closest facility.

## 2020-09-02 RX ORDER — CLOPIDOGREL BISULFATE 75 MG/1
TABLET ORAL
Qty: 90 TABLET | Refills: 4 | Status: SHIPPED | OUTPATIENT
Start: 2020-09-02

## (undated) DEVICE — SUT SILK 4/0 TIES 18IN A183H

## (undated) DEVICE — 12 FOOT DISPOSABLE EXTENSION CABLE WITH SAFE CONNECT / SCREW-DOWN

## (undated) DEVICE — Device

## (undated) DEVICE — KT INTRO SHEATH PERC W/FULL DRP 9F

## (undated) DEVICE — PK HEART OPN 10

## (undated) DEVICE — 2963 MEDIPORE SOFT CLOTH TAPE 3 IN X 10 YD 12 RLS/CS: Brand: 3M™ MEDIPORE™

## (undated) DEVICE — PRESSURE MONITORING SET: Brand: TRUWAVE

## (undated) DEVICE — Device: Brand: MEDEX

## (undated) DEVICE — DRSNG SURESITE WNDW 4X4.5

## (undated) DEVICE — OASIS DRAIN, SINGLE, INLINE & ATS COMPATIBLE: Brand: OASIS

## (undated) DEVICE — MEDI-VAC NON-CONDUCTIVE SUCTION TUBING: Brand: CARDINAL HEALTH

## (undated) DEVICE — TUBING, SUCTION, 1/4" X 10', STRAIGHT: Brand: MEDLINE

## (undated) DEVICE — SUT PROLN 7/0 CV BV1 24IN 8304H BX/36

## (undated) DEVICE — [HIGH FLOW INSUFFLATOR,  DO NOT USE IF PACKAGE IS DAMAGED,  KEEP DRY,  KEEP AWAY FROM SUNLIGHT,  PROTECT FROM HEAT AND RADIOACTIVE SOURCES.]: Brand: PNEUMOSURE

## (undated) DEVICE — SWAN-GANZ THERMODILUTION CATHETER: Brand: SWAN-GANZ

## (undated) DEVICE — BLD SCLPL BEAVR MINI STR 2BVL 180D LF

## (undated) DEVICE — BG BLD SYS

## (undated) DEVICE — SUT PROLN 4/0 RB1 D/A 36IN 8557H

## (undated) DEVICE — PAD ARMBRD SURG CONVOL 7.5X20X2IN

## (undated) DEVICE — SUT PROLN 3/0 SH D/A 36IN 8522H

## (undated) DEVICE — SUT PROLN 4/0 SH D/A 36IN 8521H

## (undated) DEVICE — MEDI-VAC YANKAUER SUCTION HANDLE W/BULBOUS TIP: Brand: CARDINAL HEALTH

## (undated) DEVICE — SUT PDS 1 CTX 36IN VIO PDP371T

## (undated) DEVICE — TEMP PACING WIRE: Brand: MYO/WIRE

## (undated) DEVICE — SUT SILK 2 SUTUPAK TIE 60IN SA8H 2STRAND

## (undated) DEVICE — AVANTI + 4F STD W/GW: Brand: AVANTI

## (undated) DEVICE — CLTH CLENS READYCLEANSE PERI CARE PK/5

## (undated) DEVICE — SAFETY SCALPEL: Brand: DEROYAL

## (undated) DEVICE — PK SPECIALTYCARE M/STATE 1 OH 1/2V CUST

## (undated) DEVICE — NDL PERC 1PRT THNWALL W/BASEPLT 18G 7CM

## (undated) DEVICE — CVR HNDL LT SURG ACCSSRY BLU STRL

## (undated) DEVICE — PRESSURE MONITORING SET: Brand: TRUWAVE, VAMP

## (undated) DEVICE — SENSR CERBRL O2 SOMASENSOR ADHS A/ LF

## (undated) DEVICE — SUT SILK 0/0 CT2 18IN C027D

## (undated) DEVICE — TOWEL,OR,DSP,ST,BLUE,STD,8/PK,10PK/CS: Brand: MEDLINE

## (undated) DEVICE — CONNECTOR PH 6-IN-1 Y ST: Brand: CARDINAL HEALTH

## (undated) DEVICE — TRAP,MUCUS SPECIMEN,40CC: Brand: MEDLINE

## (undated) DEVICE — ANTIBACTERIAL UNDYED BRAIDED (POLYGLACTIN 910), SYNTHETIC ABSORBABLE SUTURE: Brand: COATED VICRYL

## (undated) DEVICE — SOL NS 500ML

## (undated) DEVICE — VASOVIEW HEMOPRO: Brand: VASOVIEW HEMOPRO

## (undated) DEVICE — SUT PROLN 6/0 C1 D/A 30IN 8706H

## (undated) DEVICE — SUCTION CANISTER, 2500CC, RIGID: Brand: DEROYAL

## (undated) DEVICE — INTRAOPERATIVE COVER KIT, 10 PACK: Brand: SITE-RITE